# Patient Record
Sex: FEMALE | Race: WHITE | NOT HISPANIC OR LATINO | Employment: UNEMPLOYED | URBAN - METROPOLITAN AREA
[De-identification: names, ages, dates, MRNs, and addresses within clinical notes are randomized per-mention and may not be internally consistent; named-entity substitution may affect disease eponyms.]

---

## 2023-01-20 PROBLEM — R19.7 DIARRHEA: Status: ACTIVE | Noted: 2023-01-20

## 2023-01-20 PROBLEM — R15.9 FECAL INCONTINENCE: Status: ACTIVE | Noted: 2023-01-20

## 2023-01-20 PROBLEM — F41.1 GENERALIZED ANXIETY DISORDER: Status: ACTIVE | Noted: 2023-01-20

## 2023-01-20 PROBLEM — J44.9 CHRONIC OBSTRUCTIVE PULMONARY DISEASE (MULTI): Status: ACTIVE | Noted: 2023-01-20

## 2023-01-20 PROBLEM — M43.16 SPONDYLOLISTHESIS OF LUMBAR REGION: Status: ACTIVE | Noted: 2023-01-20

## 2023-01-20 PROBLEM — M51.369 DEGENERATION OF INTERVERTEBRAL DISC OF LUMBAR REGION: Status: ACTIVE | Noted: 2023-01-20

## 2023-01-20 PROBLEM — M51.36 DEGENERATION OF INTERVERTEBRAL DISC OF LUMBAR REGION: Status: ACTIVE | Noted: 2023-01-20

## 2023-01-20 PROBLEM — J30.1 SEASONAL ALLERGIC RHINITIS DUE TO POLLEN: Status: ACTIVE | Noted: 2023-01-20
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
## 2023-01-20 PROBLEM — R29.898 LEG WEAKNESS: Status: ACTIVE | Noted: 2023-01-20

## 2023-01-20 PROBLEM — M62.81 WEAKNESS OF TRUNK MUSCULATURE: Status: ACTIVE | Noted: 2023-01-20

## 2023-01-20 PROBLEM — R04.2 HEMOPTYSIS: Status: ACTIVE | Noted: 2023-01-20

## 2023-01-20 PROBLEM — R41.3 MEMORY LOSS: Status: ACTIVE | Noted: 2023-01-20

## 2023-01-20 PROBLEM — M25.511 RIGHT SHOULDER PAIN: Status: ACTIVE | Noted: 2023-01-20

## 2023-01-20 PROBLEM — E78.5 HYPERLIPIDEMIA: Status: ACTIVE | Noted: 2023-01-20

## 2023-01-20 PROBLEM — M10.9 GOUT: Status: ACTIVE | Noted: 2023-01-20

## 2023-01-20 PROBLEM — M25.50 ARTHRALGIA: Status: ACTIVE | Noted: 2023-01-20

## 2023-01-20 PROBLEM — M47.816 LUMBAR SPONDYLOSIS: Status: ACTIVE | Noted: 2023-01-20

## 2023-01-20 PROBLEM — R22.9 LUMP OF SKIN: Status: ACTIVE | Noted: 2023-01-20

## 2023-01-20 PROBLEM — R73.01 IFG (IMPAIRED FASTING GLUCOSE): Status: ACTIVE | Noted: 2023-01-20

## 2023-01-20 PROBLEM — M25.551 HIP PAIN, RIGHT: Status: ACTIVE | Noted: 2023-01-20

## 2023-01-20 RX ORDER — HYDROXYZINE HYDROCHLORIDE 50 MG/1
TABLET, FILM COATED ORAL 3 TIMES DAILY PRN
COMMUNITY
End: 2023-03-14 | Stop reason: SDUPTHER

## 2023-01-20 RX ORDER — ALBUTEROL SULFATE 90 UG/1
1 AEROSOL, METERED RESPIRATORY (INHALATION) EVERY 4 HOURS PRN
COMMUNITY
End: 2023-03-14 | Stop reason: SDUPTHER

## 2023-01-20 RX ORDER — TURMERIC 400 MG
CAPSULE ORAL
COMMUNITY

## 2023-01-20 RX ORDER — TIOTROPIUM BROMIDE INHALATION SPRAY 3.12 UG/1
2 SPRAY, METERED RESPIRATORY (INHALATION) DAILY
COMMUNITY
End: 2023-03-14 | Stop reason: SDUPTHER

## 2023-01-20 RX ORDER — ROSUVASTATIN CALCIUM 10 MG/1
10 TABLET, COATED ORAL DAILY
COMMUNITY
End: 2023-03-14 | Stop reason: SDUPTHER

## 2023-01-20 RX ORDER — SERTRALINE HYDROCHLORIDE 100 MG/1
100 TABLET, FILM COATED ORAL DAILY
COMMUNITY
End: 2023-03-14 | Stop reason: SDUPTHER

## 2023-01-20 RX ORDER — FLUTICASONE PROPIONATE AND SALMETEROL 250; 50 UG/1; UG/1
1 POWDER RESPIRATORY (INHALATION) EVERY 12 HOURS
COMMUNITY
End: 2023-03-14 | Stop reason: ALTCHOICE

## 2023-01-20 RX ORDER — FLUTICASONE PROPIONATE AND SALMETEROL 250; 50 UG/1; UG/1
1 POWDER RESPIRATORY (INHALATION) EVERY 12 HOURS
COMMUNITY
End: 2023-03-14 | Stop reason: SDUPTHER

## 2023-01-20 RX ORDER — FERROUS GLUCONATE 256(28)MG
28 TABLET ORAL
COMMUNITY
End: 2024-06-06 | Stop reason: ALTCHOICE

## 2023-01-20 RX ORDER — NICOTINE POLACRILEX 2 MG
GUM BUCCAL
COMMUNITY

## 2023-01-20 RX ORDER — MONTELUKAST SODIUM 10 MG/1
10 TABLET ORAL NIGHTLY
COMMUNITY
End: 2023-03-14 | Stop reason: SDUPTHER

## 2023-03-02 PROBLEM — R29.898 WEAKNESS OF RIGHT LOWER EXTREMITY: Status: ACTIVE | Noted: 2023-03-02

## 2023-03-06 ENCOUNTER — APPOINTMENT (OUTPATIENT)
Dept: PRIMARY CARE | Facility: CLINIC | Age: 73
End: 2023-03-06
Payer: MEDICARE

## 2023-03-09 LAB
ALANINE AMINOTRANSFERASE (SGPT) (U/L) IN SER/PLAS: 13 U/L (ref 7–45)
ALBUMIN (G/DL) IN SER/PLAS: 4.5 G/DL (ref 3.4–5)
ALKALINE PHOSPHATASE (U/L) IN SER/PLAS: 77 U/L (ref 33–136)
ANION GAP IN SER/PLAS: 12 MMOL/L (ref 10–20)
ASPARTATE AMINOTRANSFERASE (SGOT) (U/L) IN SER/PLAS: 13 U/L (ref 9–39)
BILIRUBIN TOTAL (MG/DL) IN SER/PLAS: 0.6 MG/DL (ref 0–1.2)
CALCIUM (MG/DL) IN SER/PLAS: 9.5 MG/DL (ref 8.6–10.3)
CARBON DIOXIDE, TOTAL (MMOL/L) IN SER/PLAS: 27 MMOL/L (ref 21–32)
CHLORIDE (MMOL/L) IN SER/PLAS: 107 MMOL/L (ref 98–107)
CHOLESTEROL (MG/DL) IN SER/PLAS: 167 MG/DL (ref 0–199)
CHOLESTEROL IN HDL (MG/DL) IN SER/PLAS: 42.7 MG/DL
CHOLESTEROL/HDL RATIO: 3.9
CREATININE (MG/DL) IN SER/PLAS: 0.72 MG/DL (ref 0.5–1.05)
ERYTHROCYTE DISTRIBUTION WIDTH (RATIO) BY AUTOMATED COUNT: 13.2 % (ref 11.5–14.5)
ERYTHROCYTE MEAN CORPUSCULAR HEMOGLOBIN CONCENTRATION (G/DL) BY AUTOMATED: 31.8 G/DL (ref 32–36)
ERYTHROCYTE MEAN CORPUSCULAR VOLUME (FL) BY AUTOMATED COUNT: 86 FL (ref 80–100)
ERYTHROCYTES (10*6/UL) IN BLOOD BY AUTOMATED COUNT: 5.25 X10E12/L (ref 4–5.2)
ESTIMATED AVERAGE GLUCOSE FOR HBA1C: 123 MG/DL
GFR FEMALE: 89 ML/MIN/1.73M2
GLUCOSE (MG/DL) IN SER/PLAS: 100 MG/DL (ref 74–99)
HEMATOCRIT (%) IN BLOOD BY AUTOMATED COUNT: 45 % (ref 36–46)
HEMOGLOBIN (G/DL) IN BLOOD: 14.3 G/DL (ref 12–16)
HEMOGLOBIN A1C/HEMOGLOBIN TOTAL IN BLOOD: 5.9 %
LDL: 81 MG/DL (ref 0–99)
LEUKOCYTES (10*3/UL) IN BLOOD BY AUTOMATED COUNT: 8.6 X10E9/L (ref 4.4–11.3)
NON HDL CHOLESTEROL: 124 MG/DL
PLATELETS (10*3/UL) IN BLOOD AUTOMATED COUNT: 216 X10E9/L (ref 150–450)
POTASSIUM (MMOL/L) IN SER/PLAS: 4.5 MMOL/L (ref 3.5–5.3)
PROTEIN TOTAL: 6.9 G/DL (ref 6.4–8.2)
SODIUM (MMOL/L) IN SER/PLAS: 141 MMOL/L (ref 136–145)
TRIGLYCERIDE (MG/DL) IN SER/PLAS: 216 MG/DL (ref 0–149)
UREA NITROGEN (MG/DL) IN SER/PLAS: 18 MG/DL (ref 6–23)
VLDL: 43 MG/DL (ref 0–40)

## 2023-03-14 ENCOUNTER — OFFICE VISIT (OUTPATIENT)
Dept: PRIMARY CARE | Facility: CLINIC | Age: 73
End: 2023-03-14
Payer: MEDICARE

## 2023-03-14 VITALS
DIASTOLIC BLOOD PRESSURE: 84 MMHG | BODY MASS INDEX: 30.9 KG/M2 | OXYGEN SATURATION: 95 % | HEIGHT: 64 IN | HEART RATE: 85 BPM | SYSTOLIC BLOOD PRESSURE: 131 MMHG | WEIGHT: 181 LBS | TEMPERATURE: 96.4 F

## 2023-03-14 DIAGNOSIS — J43.9 PULMONARY EMPHYSEMA, UNSPECIFIED EMPHYSEMA TYPE (MULTI): Primary | ICD-10-CM

## 2023-03-14 DIAGNOSIS — E78.2 MIXED HYPERLIPIDEMIA: ICD-10-CM

## 2023-03-14 DIAGNOSIS — J30.1 SEASONAL ALLERGIC RHINITIS DUE TO POLLEN: ICD-10-CM

## 2023-03-14 DIAGNOSIS — R73.01 IFG (IMPAIRED FASTING GLUCOSE): ICD-10-CM

## 2023-03-14 DIAGNOSIS — R05.3 PERSISTENT COUGH: Chronic | ICD-10-CM

## 2023-03-14 DIAGNOSIS — R06.02 SHORTNESS OF BREATH: Chronic | ICD-10-CM

## 2023-03-14 DIAGNOSIS — F41.1 GENERALIZED ANXIETY DISORDER: ICD-10-CM

## 2023-03-14 PROBLEM — R19.7 DIARRHEA: Status: RESOLVED | Noted: 2023-01-20 | Resolved: 2023-03-14

## 2023-03-14 PROBLEM — R04.2 HEMOPTYSIS: Status: RESOLVED | Noted: 2023-01-20 | Resolved: 2023-03-14
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
## 2023-03-14 PROBLEM — R41.3 MEMORY LOSS: Status: RESOLVED | Noted: 2023-01-20 | Resolved: 2023-03-14

## 2023-03-14 PROBLEM — M10.9 GOUT: Status: RESOLVED | Noted: 2023-01-20 | Resolved: 2023-03-14

## 2023-03-14 PROBLEM — R22.9 LUMP OF SKIN: Status: RESOLVED | Noted: 2023-01-20 | Resolved: 2023-03-14

## 2023-03-14 PROBLEM — R29.898 WEAKNESS OF RIGHT LOWER EXTREMITY: Status: RESOLVED | Noted: 2023-03-02 | Resolved: 2023-03-14

## 2023-03-14 PROCEDURE — 1160F RVW MEDS BY RX/DR IN RCRD: CPT | Performed by: STUDENT IN AN ORGANIZED HEALTH CARE EDUCATION/TRAINING PROGRAM

## 2023-03-14 PROCEDURE — 99214 OFFICE O/P EST MOD 30 MIN: CPT | Performed by: STUDENT IN AN ORGANIZED HEALTH CARE EDUCATION/TRAINING PROGRAM

## 2023-03-14 PROCEDURE — 1159F MED LIST DOCD IN RCRD: CPT | Performed by: STUDENT IN AN ORGANIZED HEALTH CARE EDUCATION/TRAINING PROGRAM

## 2023-03-14 PROCEDURE — 1036F TOBACCO NON-USER: CPT | Performed by: STUDENT IN AN ORGANIZED HEALTH CARE EDUCATION/TRAINING PROGRAM

## 2023-03-14 RX ORDER — ALBUTEROL SULFATE 90 UG/1
1 AEROSOL, METERED RESPIRATORY (INHALATION) EVERY 4 HOURS PRN
Qty: 18 G | Refills: 3 | Status: SHIPPED | OUTPATIENT
Start: 2023-03-14 | End: 2023-09-06 | Stop reason: SDUPTHER

## 2023-03-14 RX ORDER — MONTELUKAST SODIUM 10 MG/1
10 TABLET ORAL NIGHTLY
Qty: 90 TABLET | Refills: 3 | Status: SHIPPED | OUTPATIENT
Start: 2023-03-14 | End: 2023-12-13 | Stop reason: SDUPTHER

## 2023-03-14 RX ORDER — SERTRALINE HYDROCHLORIDE 100 MG/1
100 TABLET, FILM COATED ORAL DAILY
Qty: 90 TABLET | Refills: 3 | Status: SHIPPED | OUTPATIENT
Start: 2023-03-14 | End: 2024-03-21 | Stop reason: SDUPTHER

## 2023-03-14 RX ORDER — AZELASTINE 1 MG/ML
1 SPRAY, METERED NASAL 2 TIMES DAILY
Qty: 30 ML | Refills: 12 | Status: SHIPPED | OUTPATIENT
Start: 2023-03-14 | End: 2023-04-14

## 2023-03-14 RX ORDER — TIOTROPIUM BROMIDE INHALATION SPRAY 3.12 UG/1
2 SPRAY, METERED RESPIRATORY (INHALATION) DAILY
Qty: 4 G | Refills: 3 | Status: SHIPPED | OUTPATIENT
Start: 2023-03-14 | End: 2023-04-04

## 2023-03-14 RX ORDER — ROSUVASTATIN CALCIUM 10 MG/1
10 TABLET, COATED ORAL DAILY
Qty: 90 TABLET | Refills: 3 | Status: SHIPPED | OUTPATIENT
Start: 2023-03-14 | End: 2023-12-13 | Stop reason: SDUPTHER

## 2023-03-14 RX ORDER — HYDROXYZINE HYDROCHLORIDE 50 MG/1
50 TABLET, FILM COATED ORAL NIGHTLY
Qty: 30 TABLET | Refills: 3 | Status: SHIPPED | OUTPATIENT
Start: 2023-03-14 | End: 2023-04-14

## 2023-03-14 RX ORDER — FLUTICASONE PROPIONATE AND SALMETEROL 250; 50 UG/1; UG/1
1 POWDER RESPIRATORY (INHALATION) EVERY 12 HOURS
Qty: 14 EACH | Refills: 3 | Status: SHIPPED | OUTPATIENT
Start: 2023-03-14 | End: 2023-03-14

## 2023-03-14 SDOH — ECONOMIC STABILITY: FOOD INSECURITY: WITHIN THE PAST 12 MONTHS, YOU WORRIED THAT YOUR FOOD WOULD RUN OUT BEFORE YOU GOT MONEY TO BUY MORE.: NEVER TRUE

## 2023-03-14 SDOH — ECONOMIC STABILITY: FOOD INSECURITY: WITHIN THE PAST 12 MONTHS, THE FOOD YOU BOUGHT JUST DIDN'T LAST AND YOU DIDN'T HAVE MONEY TO GET MORE.: NEVER TRUE

## 2023-03-14 ASSESSMENT — PATIENT HEALTH QUESTIONNAIRE - PHQ9
3. TROUBLE FALLING OR STAYING ASLEEP: NEARLY EVERY DAY
1. LITTLE INTEREST OR PLEASURE IN DOING THINGS: NEARLY EVERY DAY
2. FEELING DOWN, DEPRESSED OR HOPELESS: NEARLY EVERY DAY
6. FEELING BAD ABOUT YOURSELF - OR THAT YOU ARE A FAILURE OR HAVE LET YOURSELF OR YOUR FAMILY DOWN: NOT AT ALL
8. MOVING OR SPEAKING SO SLOWLY THAT OTHER PEOPLE COULD HAVE NOTICED. OR THE OPPOSITE, BEING SO FIGETY OR RESTLESS THAT YOU HAVE BEEN MOVING AROUND A LOT MORE THAN USUAL: NEARLY EVERY DAY
SUM OF ALL RESPONSES TO PHQ9 QUESTIONS 1 & 2: 6
4. FEELING TIRED OR HAVING LITTLE ENERGY: NEARLY EVERY DAY
7. TROUBLE CONCENTRATING ON THINGS, SUCH AS READING THE NEWSPAPER OR WATCHING TELEVISION: NEARLY EVERY DAY
9. THOUGHTS THAT YOU WOULD BE BETTER OFF DEAD, OR OF HURTING YOURSELF: NOT AT ALL

## 2023-03-14 ASSESSMENT — ENCOUNTER SYMPTOMS
OCCASIONAL FEELINGS OF UNSTEADINESS: 1
LOSS OF SENSATION IN FEET: 0
DEPRESSION: 1

## 2023-03-14 ASSESSMENT — LIFESTYLE VARIABLES
HOW MANY STANDARD DRINKS CONTAINING ALCOHOL DO YOU HAVE ON A TYPICAL DAY: 1 OR 2
HOW OFTEN DO YOU HAVE SIX OR MORE DRINKS ON ONE OCCASION: LESS THAN MONTHLY

## 2023-03-14 NOTE — PROGRESS NOTES
"Subjective   Patient ID: Viki Kumar is a 73 y.o. female who presents for Follow-up (6 month) and Cough (For 6 months (since she quit smoking)).    Pt presenting to follow up. She has been doing well. She stopped smoking on October 2022. But since then she endorses persistent cough, She also has some dyspnea but she is unsure if it is because of her emphysema. She denies any LOC, cp, dizziness. She denies any fevers, chills, nasal drainage or congestion, postnasal drainage. Her cough is mostly dry.  Her mood has been stable. She has been using hydroxyzine as needed especially at night, it helps her to sleep.          Review of Systems   All other systems reviewed and are negative.      Objective   /84 (BP Location: Right arm, Patient Position: Sitting, BP Cuff Size: Large adult)   Pulse 85   Temp 35.8 °C (96.4 °F)   Ht 1.626 m (5' 4\")   Wt 82.1 kg (181 lb)   SpO2 95%   BMI 31.07 kg/m²     Physical Exam  Vitals and nursing note reviewed.   Constitutional:       General: She is not in acute distress.     Appearance: She is not ill-appearing.   HENT:      Head: Normocephalic and atraumatic.   Cardiovascular:      Rate and Rhythm: Normal rate and regular rhythm.      Pulses: Normal pulses.      Heart sounds: Normal heart sounds. No murmur heard.     No friction rub. No gallop.   Pulmonary:      Effort: No respiratory distress.      Breath sounds: Decreased air movement present. No stridor. Wheezing present. No rhonchi or rales.   Neurological:      Mental Status: She is alert and oriented to person, place, and time.   Psychiatric:         Mood and Affect: Mood normal.         Assessment/Plan   Problem List Items Addressed This Visit          Respiratory    Shortness of breath (Chronic)     Chronic  Unclear but most likely secondary to emphysema         Relevant Medications    albuterol 90 mcg/actuation inhaler    Other Relevant Orders    Echocardiogram stress test    Chronic obstructive pulmonary " disease (CMS/[facility]) - Primary     Severe emphysema  Quitted smoking October 2022 - hx 50 pack year  Continue with Advair and Spiriva, albuterol as needed         Relevant Medications    albuterol 90 mcg/actuation inhaler    fluticasone propion-salmeteroL (Advair Diskus) 250-50 mcg/dose diskus inhaler    tiotropium (Spiriva Respimat) 2.5 mcg/actuation inhaler    Other Relevant Orders    Echocardiogram stress test       Endocrine/Metabolic    IFG (impaired fasting glucose)     Chronic and stable  Last A1C 5.9  Continue with low carb diet            Other    Generalized anxiety disorder     Chronic and stable  Continue with sertraline and hydroxyzine as needed         Relevant Medications    hydrOXYzine HCL (Atarax) 50 mg tablet    sertraline (Zoloft) 100 mg tablet    Hyperlipidemia     Chronic and stable  Continue with crestor         Relevant Medications    rosuvastatin (Crestor) 10 mg tablet    Other Relevant Orders    Echocardiogram stress test    Seasonal allergic rhinitis due to pollen     Chronic and stable  Continue with montelukast         Relevant Medications    montelukast (Singulair) 10 mg tablet     Other Visit Diagnoses       Persistent cough  (Chronic)       Unclear etiology  Most likely due to emphysema vs allergic rhinitis vs cardiac vs other    Relevant Medications    azelastine (Astelin) 137 mcg (0.1 %) nasal spray    Other Relevant Orders    XR chest 2 views    Echocardiogram stress test

## 2023-03-14 NOTE — ASSESSMENT & PLAN NOTE
Severe emphysema  Quitted smoking October 2022 - hx 50 pack year  Continue with Advair and Spiriva, albuterol as needed

## 2023-04-04 ENCOUNTER — OFFICE VISIT (OUTPATIENT)
Dept: PRIMARY CARE | Facility: CLINIC | Age: 73
End: 2023-04-04
Payer: MEDICARE

## 2023-04-04 VITALS
HEART RATE: 84 BPM | WEIGHT: 180 LBS | SYSTOLIC BLOOD PRESSURE: 136 MMHG | DIASTOLIC BLOOD PRESSURE: 84 MMHG | TEMPERATURE: 96.8 F | RESPIRATION RATE: 16 BRPM | HEIGHT: 64 IN | BODY MASS INDEX: 30.73 KG/M2 | OXYGEN SATURATION: 92 %

## 2023-04-04 DIAGNOSIS — Z12.12 SCREENING FOR COLORECTAL CANCER: ICD-10-CM

## 2023-04-04 DIAGNOSIS — J43.9 PULMONARY EMPHYSEMA, UNSPECIFIED EMPHYSEMA TYPE (MULTI): ICD-10-CM

## 2023-04-04 DIAGNOSIS — Z87.891 FORMER SMOKER: ICD-10-CM

## 2023-04-04 DIAGNOSIS — M85.051 FIBROUS DYSPLASIA (MONOSTOTIC), RIGHT THIGH: ICD-10-CM

## 2023-04-04 DIAGNOSIS — Z00.00 ENCOUNTER FOR MEDICARE ANNUAL WELLNESS EXAM: Primary | ICD-10-CM

## 2023-04-04 DIAGNOSIS — Z12.11 SCREENING FOR COLORECTAL CANCER: ICD-10-CM

## 2023-04-04 DIAGNOSIS — Z78.0 ASYMPTOMATIC MENOPAUSAL STATE: ICD-10-CM

## 2023-04-04 PROBLEM — J44.0 COPD (CHRONIC OBSTRUCTIVE PULMONARY DISEASE) WITH ACUTE BRONCHITIS (MULTI): Status: ACTIVE | Noted: 2023-04-04

## 2023-04-04 PROBLEM — R06.02 SHORTNESS OF BREATH: Chronic | Status: RESOLVED | Noted: 2023-03-14 | Resolved: 2023-04-04

## 2023-04-04 PROBLEM — M25.511 RIGHT SHOULDER PAIN: Status: RESOLVED | Noted: 2023-01-20 | Resolved: 2023-04-04

## 2023-04-04 PROBLEM — J44.0 COPD (CHRONIC OBSTRUCTIVE PULMONARY DISEASE) WITH ACUTE BRONCHITIS (MULTI): Status: RESOLVED | Noted: 2023-04-04 | Resolved: 2023-04-04

## 2023-04-04 PROBLEM — J20.9 COPD (CHRONIC OBSTRUCTIVE PULMONARY DISEASE) WITH ACUTE BRONCHITIS (MULTI): Status: RESOLVED | Noted: 2023-04-04 | Resolved: 2023-04-04

## 2023-04-04 PROBLEM — J20.9 COPD (CHRONIC OBSTRUCTIVE PULMONARY DISEASE) WITH ACUTE BRONCHITIS (MULTI): Status: ACTIVE | Noted: 2023-04-04

## 2023-04-04 PROBLEM — M62.81 WEAKNESS OF TRUNK MUSCULATURE: Status: RESOLVED | Noted: 2023-01-20 | Resolved: 2023-04-04

## 2023-04-04 PROCEDURE — 1160F RVW MEDS BY RX/DR IN RCRD: CPT

## 2023-04-04 PROCEDURE — 99214 OFFICE O/P EST MOD 30 MIN: CPT

## 2023-04-04 PROCEDURE — 1036F TOBACCO NON-USER: CPT

## 2023-04-04 PROCEDURE — 1170F FXNL STATUS ASSESSED: CPT

## 2023-04-04 PROCEDURE — G0444 DEPRESSION SCREEN ANNUAL: HCPCS

## 2023-04-04 PROCEDURE — 1159F MED LIST DOCD IN RCRD: CPT

## 2023-04-04 PROCEDURE — G0439 PPPS, SUBSEQ VISIT: HCPCS

## 2023-04-04 RX ORDER — FLUTICASONE FUROATE, UMECLIDINIUM BROMIDE AND VILANTEROL TRIFENATATE 100; 62.5; 25 UG/1; UG/1; UG/1
1 POWDER RESPIRATORY (INHALATION) DAILY
Qty: 28 EACH | Refills: 11 | Status: SHIPPED | OUTPATIENT
Start: 2023-04-04 | End: 2023-09-06 | Stop reason: ALTCHOICE

## 2023-04-04 ASSESSMENT — ENCOUNTER SYMPTOMS
GASTROINTESTINAL NEGATIVE: 1
CARDIOVASCULAR NEGATIVE: 1
CONSTITUTIONAL NEGATIVE: 1
EYES NEGATIVE: 1
HEMATOLOGIC/LYMPHATIC NEGATIVE: 1
ENDOCRINE NEGATIVE: 1
PSYCHIATRIC NEGATIVE: 1
WEAKNESS: 1
SHORTNESS OF BREATH: 1
MUSCULOSKELETAL NEGATIVE: 1
COUGH: 1

## 2023-04-04 ASSESSMENT — ACTIVITIES OF DAILY LIVING (ADL)
GROCERY_SHOPPING: INDEPENDENT
MANAGING_FINANCES: INDEPENDENT
DRESSING: INDEPENDENT
TAKING_MEDICATION: INDEPENDENT
DOING_HOUSEWORK: INDEPENDENT
BATHING: INDEPENDENT

## 2023-04-04 ASSESSMENT — PATIENT HEALTH QUESTIONNAIRE - PHQ9
SUM OF ALL RESPONSES TO PHQ9 QUESTIONS 1 AND 2: 0
2. FEELING DOWN, DEPRESSED OR HOPELESS: NOT AT ALL
1. LITTLE INTEREST OR PLEASURE IN DOING THINGS: NOT AT ALL

## 2023-04-04 ASSESSMENT — PAIN SCALES - GENERAL: PAINLEVEL: 0-NO PAIN

## 2023-04-04 NOTE — ASSESSMENT & PLAN NOTE
Having a lot of coughing and shortness of breath on exertion with Spiriva and Advair    Switch her to Trelogy 100-62.5-25mcg 1 puff daily to see if this improves breathing issues

## 2023-04-04 NOTE — ASSESSMENT & PLAN NOTE
Flu vaccination: Previously given this season  PCV: Previously given  PPSV: Previously given  Shingrix vaccine: Previously given  Cologuard: Recommended and ordered today  Mammogram: Completed 1/2023  DEXA scan: Recommended and ordered today

## 2023-04-04 NOTE — PROGRESS NOTES
Subjective   Reason for Visit: Viki Kumar is an 73 y.o. female here for a Medicare Wellness visit.     Quit smoking 6 months ago.  Having a lot of coughing throughout the day.     Frequent falls- a few times per month due to feeling off balance. Fell off pool ladder two years ago which required ED visit, no serious injury since then.    Frequently tired and out of breath.     Diet: Eating a good amount of veggies, fruits and protein. Ice cream nightly, 1-2 big can soda per day Drinking half per sitting. Drinking 2 cups coffee per day  Exercise: No regular exercise due to back pain and balance issues  Weight: Stable  Water: Drinking 6-8 bottles per day   Sleep: Bad sleep, thinks she might be getting too much sleep. Napping during the day. Goes to bed around 9P, sometimes already in bed. Waking up around 8:30AM, waking up frequently through the night. Has to urinate overnight  Social: , son and her  live with her  Professional: Retired /      Past Medical, Surgical, and Family History reviewed and updated in chart.    Reviewed all medications by prescribing practitioner or clinical pharmacist (such as prescriptions, OTCs, herbal therapies and supplements) and documented in the medical record.        Patient Care Team:  JADA WallerCNS as PCP - General (Family Medicine)     Review of Systems   Constitutional: Negative.    HENT: Negative.     Eyes: Negative.    Respiratory:  Positive for cough and shortness of breath.    Cardiovascular: Negative.    Gastrointestinal: Negative.    Endocrine: Negative.    Genitourinary: Negative.    Musculoskeletal: Negative.    Skin: Negative.    Neurological:  Positive for weakness.   Hematological: Negative.    Psychiatric/Behavioral: Negative.         Objective   Vitals:  /84 (BP Location: Left arm, Patient Position: Sitting, BP Cuff Size: Adult)   Pulse 84   Temp 36 °C (96.8 °F) (Temporal)   Resp 16   Ht 1.619 m  "(5' 3.75\")   Wt 81.6 kg (180 lb)   SpO2 92%   BMI 31.14 kg/m²       Physical Exam  Constitutional:       Appearance: Normal appearance.   HENT:      Head: Normocephalic and atraumatic.   Eyes:      Extraocular Movements: Extraocular movements intact.      Pupils: Pupils are equal, round, and reactive to light.   Cardiovascular:      Rate and Rhythm: Normal rate and regular rhythm.   Pulmonary:      Effort: Pulmonary effort is normal.      Breath sounds: Normal breath sounds.   Abdominal:      General: Abdomen is flat. Bowel sounds are normal.      Palpations: Abdomen is soft.   Musculoskeletal:         General: Normal range of motion.   Skin:     General: Skin is warm and dry.   Neurological:      General: No focal deficit present.      Mental Status: She is alert and oriented to person, place, and time.   Psychiatric:         Mood and Affect: Mood normal.         Behavior: Behavior normal.         Assessment/Plan   Problem List Items Addressed This Visit       Chronic obstructive pulmonary disease (CMS/HCC)    Current Assessment & Plan     Having a lot of coughing and shortness of breath on exertion with Spiriva and Advair    Switch her to Trelogy 100-62.5-25mcg 1 puff daily to see if this improves breathing issues         Relevant Medications    fluticasone-umeclidin-vilanter (Trelegy Ellipta) 100-62.5-25 mcg blister with device    Other Relevant Orders    Follow Up In Primary Care    CT lung screening low dose    Encounter for Medicare annual wellness exam - Primary    Overview     Diet: Eating a good amount of veggies, fruits and protein. Ice cream nightly, 1-2 big can soda per day Drinking half per sitting. Drinking 2 cups coffee per day  Exercise: No regular exercise due to back pain and balance issues  Weight: Stable  Water: Drinking 6-8 bottles per day   Sleep: Bad sleep, thinks she might be getting too much sleep. Napping during the day. Goes to bed around 9P, sometimes already in bed. Waking up around " 8:30AM, waking up frequently through the night. Has to urinate overnight  Social: , son and her  live with her  Professional: Retired /         Current Assessment & Plan       Flu vaccination: Previously given this season  PCV: Previously given  PPSV: Previously given  Shingrix vaccine: Previously given  Cologuard: Recommended and ordered today  Mammogram: Completed 1/2023  DEXA scan: Recommended and ordered today           Other Visit Diagnoses       Asymptomatic menopausal state        Relevant Orders    XR DEXA bone density    Screening for colorectal cancer        Relevant Orders    Colonoscopy    Former smoker        Relevant Orders    CT lung screening low dose    CT cardiac scoring wo IV contrast          Follow up with me in September or sooner as needed    Jeanna French APRN-CNS

## 2023-04-04 NOTE — PATIENT INSTRUCTIONS
Thank you for coming to see me today.  If you have any questions or concerns following our visit, please contact the office.  Phone: [phone]    Follow up with me in September or sooner as needed    1)  Please schedule a bone density scan to screen for osteoporosis and a colonoscopy - please call [phone] or stop to 's office (in the lab office) on your way out today.     2) START Trelogy inhaler- 1 puff daily once you're out of spiriva and advair inhalers.    3) Please schedule a CT Cardiac score and Low Dose CT Chest - please call [phone] or stop to 's office (in the lab office) on your way out today.

## 2023-04-08 DIAGNOSIS — F41.1 GENERALIZED ANXIETY DISORDER: ICD-10-CM

## 2023-04-08 DIAGNOSIS — R05.3 PERSISTENT COUGH: Chronic | ICD-10-CM

## 2023-04-12 DIAGNOSIS — J43.9 PULMONARY EMPHYSEMA, UNSPECIFIED EMPHYSEMA TYPE (MULTI): ICD-10-CM

## 2023-04-12 PROBLEM — R29.898 WEAKNESS OF RIGHT LOWER EXTREMITY: Status: ACTIVE | Noted: 2023-04-12

## 2023-04-12 PROBLEM — F32.A DEPRESSION: Status: ACTIVE | Noted: 2023-04-12

## 2023-04-12 PROBLEM — M62.81 WEAKNESS OF TRUNK MUSCULATURE: Status: ACTIVE | Noted: 2023-04-12

## 2023-04-12 PROBLEM — M10.9 GOUT: Status: ACTIVE | Noted: 2023-04-12

## 2023-04-12 PROBLEM — R41.3 MEMORY LOSS: Status: ACTIVE | Noted: 2023-04-12

## 2023-04-12 PROBLEM — R04.2 HEMOPTYSIS: Status: ACTIVE | Noted: 2023-04-12

## 2023-04-12 PROBLEM — M25.511 RIGHT SHOULDER PAIN: Status: ACTIVE | Noted: 2023-04-12
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
## 2023-04-12 PROBLEM — R06.02 SHORTNESS OF BREATH AT REST: Status: ACTIVE | Noted: 2017-02-22

## 2023-04-12 PROBLEM — R19.7 DIARRHEA: Status: ACTIVE | Noted: 2023-04-12

## 2023-04-12 PROBLEM — R22.9 LUMP OF SKIN: Status: ACTIVE | Noted: 2023-04-12

## 2023-04-12 PROBLEM — J20.9 ACUTE BRONCHITIS: Status: ACTIVE | Noted: 2017-02-22

## 2023-04-12 PROBLEM — R06.2 WHEEZING: Status: ACTIVE | Noted: 2017-02-22

## 2023-04-12 RX ORDER — BUSPIRONE HYDROCHLORIDE 7.5 MG/1
7.5 TABLET ORAL
COMMUNITY
End: 2024-06-06 | Stop reason: ALTCHOICE

## 2023-04-12 RX ORDER — MULTIVIT-MIN/IRON/FOLIC ACID/K 18-600-40
CAPSULE ORAL
COMMUNITY
End: 2023-12-13 | Stop reason: SDUPTHER

## 2023-04-12 RX ORDER — TIOTROPIUM BROMIDE 18 UG/1
1 CAPSULE ORAL; RESPIRATORY (INHALATION)
COMMUNITY
End: 2023-09-06 | Stop reason: SDUPTHER

## 2023-04-12 RX ORDER — FLUTICASONE PROPIONATE AND SALMETEROL 250; 50 UG/1; UG/1
1 POWDER RESPIRATORY (INHALATION) EVERY 12 HOURS
COMMUNITY
Start: 2022-12-02 | End: 2023-04-14 | Stop reason: ALTCHOICE

## 2023-04-12 RX ORDER — IBUPROFEN 200 MG
200 TABLET ORAL EVERY 2 HOUR PRN
COMMUNITY
Start: 2011-05-09

## 2023-04-12 RX ORDER — UMECLIDINIUM 62.5 UG/1
1 AEROSOL, POWDER ORAL DAILY
COMMUNITY
End: 2023-09-06 | Stop reason: ALTCHOICE

## 2023-04-12 RX ORDER — ALPRAZOLAM 0.5 MG/1
0.5 TABLET ORAL
COMMUNITY
Start: 2011-05-09 | End: 2023-06-12 | Stop reason: SDUPTHER

## 2023-04-14 RX ORDER — HYDROXYZINE HYDROCHLORIDE 50 MG/1
TABLET, FILM COATED ORAL
Qty: 30 TABLET | Refills: 5 | Status: SHIPPED | OUTPATIENT
Start: 2023-04-14 | End: 2024-06-06 | Stop reason: ALTCHOICE

## 2023-04-14 RX ORDER — AZELASTINE 1 MG/ML
SPRAY, METERED NASAL
Qty: 30 ML | Refills: 5 | Status: SHIPPED | OUTPATIENT
Start: 2023-04-14 | End: 2024-06-06 | Stop reason: ALTCHOICE

## 2023-04-14 RX ORDER — FLUTICASONE PROPIONATE AND SALMETEROL 250; 50 UG/1; UG/1
1 POWDER RESPIRATORY (INHALATION)
Qty: 60 EACH | Refills: 1 | Status: SHIPPED | OUTPATIENT
Start: 2023-04-14 | End: 2023-12-13

## 2023-05-10 ENCOUNTER — HOSPITAL ENCOUNTER (OUTPATIENT)
Dept: DATA CONVERSION | Facility: HOSPITAL | Age: 73
End: 2023-05-10
Attending: INTERNAL MEDICINE | Admitting: INTERNAL MEDICINE
Payer: MEDICARE

## 2023-05-10 DIAGNOSIS — E78.5 HYPERLIPIDEMIA, UNSPECIFIED: ICD-10-CM

## 2023-05-10 DIAGNOSIS — E66.9 OBESITY, UNSPECIFIED: ICD-10-CM

## 2023-05-10 DIAGNOSIS — D12.3 BENIGN NEOPLASM OF TRANSVERSE COLON: ICD-10-CM

## 2023-05-10 DIAGNOSIS — Z86.010 PERSONAL HISTORY OF COLONIC POLYPS: ICD-10-CM

## 2023-05-10 DIAGNOSIS — K21.9 GASTRO-ESOPHAGEAL REFLUX DISEASE WITHOUT ESOPHAGITIS: ICD-10-CM

## 2023-05-10 DIAGNOSIS — F41.9 ANXIETY DISORDER, UNSPECIFIED: ICD-10-CM

## 2023-05-10 DIAGNOSIS — M10.9 GOUT, UNSPECIFIED: ICD-10-CM

## 2023-05-10 DIAGNOSIS — Z87.891 PERSONAL HISTORY OF NICOTINE DEPENDENCE: ICD-10-CM

## 2023-05-10 DIAGNOSIS — F31.9 BIPOLAR DISORDER, UNSPECIFIED (MULTI): ICD-10-CM

## 2023-05-10 DIAGNOSIS — J44.9 CHRONIC OBSTRUCTIVE PULMONARY DISEASE, UNSPECIFIED (MULTI): ICD-10-CM

## 2023-05-10 DIAGNOSIS — K57.30 DIVERTICULOSIS OF LARGE INTESTINE WITHOUT PERFORATION OR ABSCESS WITHOUT BLEEDING: ICD-10-CM

## 2023-05-10 DIAGNOSIS — K64.8 OTHER HEMORRHOIDS: ICD-10-CM

## 2023-05-10 DIAGNOSIS — D12.4 BENIGN NEOPLASM OF DESCENDING COLON: ICD-10-CM

## 2023-05-10 DIAGNOSIS — D12.2 BENIGN NEOPLASM OF ASCENDING COLON: ICD-10-CM

## 2023-05-10 DIAGNOSIS — Z12.11 ENCOUNTER FOR SCREENING FOR MALIGNANT NEOPLASM OF COLON: ICD-10-CM

## 2023-05-16 LAB
COMPLETE PATHOLOGY REPORT: NORMAL
CONVERTED CLINICAL DIAGNOSIS-HISTORY: NORMAL
CONVERTED FINAL DIAGNOSIS: NORMAL
CONVERTED FINAL REPORT PDF LINK TO COPY AND PASTE: NORMAL
CONVERTED GROSS DESCRIPTION: NORMAL

## 2023-06-12 DIAGNOSIS — F41.1 GENERALIZED ANXIETY DISORDER: Primary | ICD-10-CM

## 2023-06-12 RX ORDER — ALPRAZOLAM 0.5 MG/1
0.5 TABLET ORAL DAILY PRN
Qty: 30 TABLET | Refills: 0 | Status: SHIPPED | OUTPATIENT
Start: 2023-06-12 | End: 2024-06-06 | Stop reason: ALTCHOICE

## 2023-06-12 NOTE — TELEPHONE ENCOUNTER
I can give her 30 tabs but for long term she will need to be seen sooner than September. Also will need to sign CSA with me for further refills. Please let her know she should use sparingly as use of this medication will increase her risk for drowsiness and falls

## 2023-06-12 NOTE — TELEPHONE ENCOUNTER
Refill requested for Viki's alprazolam (Xanax).  The medication was last refilled on 12/30/2021 with 60 tablets and 0 refills. Will issue her 30 tablets with no refills and will need UDS and CSA for further dispensations.    Jeanna WILLIAMSON-CNS

## 2023-06-12 NOTE — TELEPHONE ENCOUNTER
Patient is calling to request a script for ALPRAZolam (Xanax) 0.5 mg .  States that you spoke about this during your last visit.    LOV 4/4 NOV 9/14    Pharmacy is [facility] in [place]

## 2023-08-08 ENCOUNTER — APPOINTMENT (OUTPATIENT)
Dept: PRIMARY CARE | Facility: CLINIC | Age: 73
End: 2023-08-08
Payer: MEDICARE

## 2023-09-06 ENCOUNTER — OFFICE VISIT (OUTPATIENT)
Dept: PRIMARY CARE | Facility: CLINIC | Age: 73
End: 2023-09-06
Payer: MEDICARE

## 2023-09-06 VITALS
SYSTOLIC BLOOD PRESSURE: 124 MMHG | BODY MASS INDEX: 31.31 KG/M2 | HEART RATE: 74 BPM | RESPIRATION RATE: 16 BRPM | DIASTOLIC BLOOD PRESSURE: 74 MMHG | TEMPERATURE: 96.4 F | OXYGEN SATURATION: 99 % | WEIGHT: 181 LBS

## 2023-09-06 DIAGNOSIS — R73.9 HYPERGLYCEMIA: ICD-10-CM

## 2023-09-06 DIAGNOSIS — R06.02 SHORTNESS OF BREATH: Chronic | ICD-10-CM

## 2023-09-06 DIAGNOSIS — J43.9 PULMONARY EMPHYSEMA, UNSPECIFIED EMPHYSEMA TYPE (MULTI): ICD-10-CM

## 2023-09-06 DIAGNOSIS — Z96.641 HISTORY OF HIP REPLACEMENT, TOTAL, RIGHT: ICD-10-CM

## 2023-09-06 DIAGNOSIS — M81.0 OSTEOPOROSIS, UNSPECIFIED OSTEOPOROSIS TYPE, UNSPECIFIED PATHOLOGICAL FRACTURE PRESENCE: ICD-10-CM

## 2023-09-06 DIAGNOSIS — E78.2 MIXED HYPERLIPIDEMIA: ICD-10-CM

## 2023-09-06 DIAGNOSIS — F51.04 PSYCHOPHYSIOLOGICAL INSOMNIA: ICD-10-CM

## 2023-09-06 DIAGNOSIS — M19.90 OSTEOARTHRITIS, UNSPECIFIED OSTEOARTHRITIS TYPE, UNSPECIFIED SITE: Primary | ICD-10-CM

## 2023-09-06 PROBLEM — L90.5 SCAR CONDITION AND FIBROSIS OF SKIN: Status: ACTIVE | Noted: 2017-02-28

## 2023-09-06 PROBLEM — L57.0 ACTINIC KERATOSIS: Status: ACTIVE | Noted: 2017-02-28

## 2023-09-06 PROBLEM — L82.1 OTHER SEBORRHEIC KERATOSIS: Status: ACTIVE | Noted: 2017-02-28

## 2023-09-06 PROBLEM — D48.5 NEOPLASM OF UNCERTAIN BEHAVIOR OF SKIN: Status: ACTIVE | Noted: 2017-02-28

## 2023-09-06 PROBLEM — L57.9 SKIN CHANGES DUE TO CHRONIC EXPOSURE TO NONIONIZING RADIATION, UNSPECIFIED: Status: ACTIVE | Noted: 2017-02-28

## 2023-09-06 PROCEDURE — 1159F MED LIST DOCD IN RCRD: CPT

## 2023-09-06 PROCEDURE — 1160F RVW MEDS BY RX/DR IN RCRD: CPT

## 2023-09-06 PROCEDURE — 1036F TOBACCO NON-USER: CPT

## 2023-09-06 PROCEDURE — 1126F AMNT PAIN NOTED NONE PRSNT: CPT

## 2023-09-06 PROCEDURE — 99214 OFFICE O/P EST MOD 30 MIN: CPT

## 2023-09-06 RX ORDER — FLUTICASONE PROPIONATE AND SALMETEROL 500; 50 UG/1; UG/1
1 POWDER RESPIRATORY (INHALATION)
Qty: 60 EACH | Refills: 11 | Status: SHIPPED | OUTPATIENT
Start: 2023-09-06 | End: 2023-12-13

## 2023-09-06 RX ORDER — TIOTROPIUM BROMIDE 18 UG/1
1 CAPSULE ORAL; RESPIRATORY (INHALATION)
Qty: 30 CAPSULE | Refills: 11 | Status: SHIPPED | OUTPATIENT
Start: 2023-09-06 | End: 2024-06-06 | Stop reason: ALTCHOICE

## 2023-09-06 RX ORDER — DENOSUMAB 60 MG/ML
60 INJECTION SUBCUTANEOUS ONCE
Qty: 1 ML | Refills: 0 | Status: SHIPPED | OUTPATIENT
Start: 2023-09-06 | End: 2023-09-06

## 2023-09-06 RX ORDER — ALBUTEROL SULFATE 90 UG/1
1 AEROSOL, METERED RESPIRATORY (INHALATION) EVERY 4 HOURS PRN
Qty: 18 G | Refills: 11 | Status: SHIPPED | OUTPATIENT
Start: 2023-09-06 | End: 2024-06-06

## 2023-09-06 RX ORDER — TRAZODONE HYDROCHLORIDE 50 MG/1
50 TABLET ORAL NIGHTLY PRN
Qty: 30 TABLET | Refills: 5 | Status: SHIPPED | OUTPATIENT
Start: 2023-09-06 | End: 2023-10-04

## 2023-09-06 SDOH — ECONOMIC STABILITY: FOOD INSECURITY: WITHIN THE PAST 12 MONTHS, YOU WORRIED THAT YOUR FOOD WOULD RUN OUT BEFORE YOU GOT MONEY TO BUY MORE.: NEVER TRUE

## 2023-09-06 SDOH — ECONOMIC STABILITY: FOOD INSECURITY: WITHIN THE PAST 12 MONTHS, THE FOOD YOU BOUGHT JUST DIDN'T LAST AND YOU DIDN'T HAVE MONEY TO GET MORE.: NEVER TRUE

## 2023-09-06 ASSESSMENT — ENCOUNTER SYMPTOMS
DEPRESSION: 0
CARDIOVASCULAR NEGATIVE: 1
RESPIRATORY NEGATIVE: 1
HEMATOLOGIC/LYMPHATIC NEGATIVE: 1
LOSS OF SENSATION IN FEET: 0
ENDOCRINE NEGATIVE: 1
EYES NEGATIVE: 1
GASTROINTESTINAL NEGATIVE: 1
NEUROLOGICAL NEGATIVE: 1
CONSTITUTIONAL NEGATIVE: 1
PSYCHIATRIC NEGATIVE: 1
MUSCULOSKELETAL NEGATIVE: 1
OCCASIONAL FEELINGS OF UNSTEADINESS: 1

## 2023-09-06 ASSESSMENT — LIFESTYLE VARIABLES
HOW OFTEN DO YOU HAVE SIX OR MORE DRINKS ON ONE OCCASION: NEVER
HOW OFTEN DO YOU HAVE A DRINK CONTAINING ALCOHOL: NEVER
AUDIT-C TOTAL SCORE: 0
HOW MANY STANDARD DRINKS CONTAINING ALCOHOL DO YOU HAVE ON A TYPICAL DAY: PATIENT DOES NOT DRINK
SKIP TO QUESTIONS 9-10: 1

## 2023-09-06 ASSESSMENT — PATIENT HEALTH QUESTIONNAIRE - PHQ9
SUM OF ALL RESPONSES TO PHQ9 QUESTIONS 1 & 2: 0
2. FEELING DOWN, DEPRESSED OR HOPELESS: NOT AT ALL
1. LITTLE INTEREST OR PLEASURE IN DOING THINGS: NOT AT ALL

## 2023-09-06 ASSESSMENT — PAIN SCALES - GENERAL: PAINLEVEL: 0-NO PAIN

## 2023-09-06 NOTE — PATIENT INSTRUCTIONS
Thank you for coming to see me today.  If you have any questions or concerns following our visit, please contact the office.  Phone: [phone]    Follow up with me in 3-4 months or sooner as needed to see how     1)  INCREASE wixela to 500-50 1 inhalation twice daily. Take Spiriva every day.     2)  I am referring you to orthopedic surgery for hip evaluation and hand injections - please call [phone] to schedule an appointment or stop to 's office (in the lab) on your way out today      3) Get fasting labwork in the next 1-2 weeks.  The lab is down the [address] from our office.     4) START trazodone 50-100mg as needed for sleep at bedtime    5) START Prolia injections every 6 months for osteoporosis. The infusion center will call you to set these up once approved via insurance

## 2023-09-06 NOTE — PROGRESS NOTES
Subjective   Patient ID: Viki Kumar is a 73 y.o. female who presents for follow up.    1) Breathing- doesn't like Trelogy, would like to go back to [facility]. Short of breath walking 10-15 feet or with changing clothes.     2) Falling- having issues with falls at home, stumbles a lot then starts losing balance and then she starts to fall. Doesn't do well with uneven ground. Right hip replaced 20 years ago, was told 5 years ago that it was fine. Presented due to limp. Not having pain.   **Refer to ortho hip to evaluate, recs appreciated    3) Arthritis pain- fingers locking up attributes to hand spasm.   **Refer to ortho hand for evaluation    Diet: Eating a good amount of veggies, fruits and protein. Ice cream nightly, 1-2 big can soda per day Drinking half per sitting. Drinking 2 cups coffee per day  Exercise: No regular exercise due to back pain and balance issues  Weight: Stable  Water: Drinking 6-8 bottles per day   Sleep: Bad sleep, thinks she might be getting too much sleep. Napping during the day. Goes to bed around 9P, sometimes already in bed. Waking up around 8:30AM, waking up frequently through the night. Has to urinate overnight  Social: , son and her  live with her  Professional: Retired /    Review of Systems   Constitutional: Negative.    HENT: Negative.     Eyes: Negative.    Respiratory: Negative.     Cardiovascular: Negative.    Gastrointestinal: Negative.    Endocrine: Negative.    Genitourinary: Negative.    Musculoskeletal: Negative.    Skin: Negative.    Neurological: Negative.    Hematological: Negative.    Psychiatric/Behavioral: Negative.          Current Outpatient Medications   Medication Sig Dispense Refill    ALPRAZolam (Xanax) 0.5 mg tablet Take 1 tablet (0.5 mg) by mouth once daily as needed for anxiety. 30 tablet 0    ascorbic acid (VITAMIN C ORAL) Take by mouth.      ascorbic acid, vitamin C, 500 mg capsule Take by mouth.      azelastine  (Astelin) 137 mcg (0.1 %) nasal spray SPRAY 1 SPRAY INTO EACH NOSTRIL IN THE MORNING AND BEFORE BEDTIME AS DIRECTED 30 mL 5    biotin 1 mg capsule Take by mouth.      busPIRone (Buspar) 7.5 mg tablet Take 1 tablet (7.5 mg) by mouth.      CALCIUM CITRATE ORAL Take by mouth.      CALCIUM ORAL Take by mouth.      docosahexaenoic acid/epa (FISH OIL ORAL) Take by mouth.      ferrous gluconate 256 mg (28 mg iron) tablet Take 1 tablet (28 mg) by mouth.      fluticasone propion-salmeteroL (Wixela Inhub) 250-50 mcg/dose diskus inhaler Inhale 1 puff  in the morning and 1 puff in the evening. 60 each 1    hydrOXYzine HCL (Atarax) 50 mg tablet TAKE 1 TABLET BY MOUTH ONCE DAILY AT BEDTIME. 30 tablet 5    ibuprofen 200 mg tablet Take 1 tablet (200 mg) by mouth every 2 hours if needed.      MAGNESIUM ORAL Take 200 mg by mouth.      MILK THISTLE ORAL Take 1,000 mg by mouth.      montelukast (Singulair) 10 mg tablet Take 1 tablet (10 mg) by mouth once daily at bedtime. 90 tablet 3    rosuvastatin (Crestor) 10 mg tablet Take 1 tablet (10 mg) by mouth once daily. 90 tablet 3    sertraline (Zoloft) 100 mg tablet Take 1 tablet (100 mg) by mouth once daily. 90 tablet 3    turmeric 400 mg capsule Take by mouth.      VITAMIN B COMPLEX ORAL Take by mouth.      ZINC ORAL Take 100 mg by mouth.      albuterol 90 mcg/actuation inhaler Inhale 1 puff every 4 hours if needed for wheezing or shortness of breath. 18 g 11    denosumab (Prolia) 60 mg/mL syringe Inject 1 mL (60 mg) under the skin 1 time for 1 dose. 1 mL 0    fluticasone propion-salmeteroL (Wixela Inhub) 500-50 mcg/dose diskus inhaler Inhale 1 puff 2 times a day. Rinse mouth with water after use to reduce aftertaste and incidence of candidiasis. Do not swallow. 60 each 11    tiotropium (Spiriva with HandiHaler) 18 mcg inhalation capsule Place 1 capsule (18 mcg) into inhaler and inhale once daily. 30 capsule 11    traZODone (Desyrel) 50 mg tablet Take 1 tablet (50 mg) by mouth as needed  at bedtime for sleep. 30 tablet 5     No current facility-administered medications for this visit.     Past Surgical History:   Procedure Laterality Date    OTHER SURGICAL HISTORY  2018    Hernia repair    OTHER SURGICAL HISTORY  2018    Tonsillectomy    OTHER SURGICAL HISTORY  2018    Hip surgery    OTHER SURGICAL HISTORY  2018    Bone transplantation autograft     Family History   Problem Relation Name Age of Onset    Other (SMALL CELL CARCINOMA) Mother      Emphysema Mother      Emphysema Father      Heart failure Father      Emphysema Brother Mayo     Lung cancer Brother Mayo     Coronary artery disease Brother Mayo     Other (cardiac bypass) Brother Mayo     Other (shoulder replacement) Brother Timoteo Lizama Brother Timoteo     Memory loss Brother Manjit Lizama Brother Manjit     Accidental death Brother Amilcar       Social History     Tobacco Use    Smoking status: Former     Packs/day: 1.00     Years: 50.00     Additional pack years: 0.00     Total pack years: 50.00     Types: Cigarettes     Quit date: 10/1/2022     Years since quittin.9    Smokeless tobacco: Never   Vaping Use    Vaping Use: Former   Substance Use Topics    Alcohol use: Yes     Alcohol/week: 1.0 standard drink of alcohol     Types: 1 Standard drinks or equivalent per week    Drug use: Not Currently        Objective     Visit Vitals  /74 (BP Location: Left arm, Patient Position: Sitting, BP Cuff Size: Adult)   Pulse 74   Temp 35.8 °C (96.4 °F) (Temporal)   Resp 16   Wt 82.1 kg (181 lb)   SpO2 99%   BMI 31.31 kg/m²   Smoking Status Former   BSA 1.92 m²        Physical Exam  Constitutional:       Appearance: Normal appearance.   HENT:      Head: Normocephalic and atraumatic.   Eyes:      Extraocular Movements: Extraocular movements intact.      Pupils: Pupils are equal, round, and reactive to light.   Musculoskeletal:         General: Normal range of motion.   Neurological:      General: No focal deficit present.       Mental Status: She is alert and oriented to person, place, and time.   Psychiatric:         Mood and Affect: Mood normal.         Behavior: Behavior normal.           Assessment/Plan   Problem List Items Addressed This Visit       Chronic obstructive pulmonary disease (CMS/HCC)     Doesn't like Trelogy, having more mucous and shortness of breath, short of breath walking in her home, putting clothes on    Start Wixela to 500-50mg   Refill spiriva and albuterol inhaler         Relevant Medications    tiotropium (Spiriva with HandiHaler) 18 mcg inhalation capsule    fluticasone propion-salmeteroL (Wixela Inhub) 500-50 mcg/dose diskus inhaler    albuterol 90 mcg/actuation inhaler    Hyperlipidemia    Relevant Orders    Comprehensive Metabolic Panel    Lipid Panel     Other Visit Diagnoses       Osteoarthritis, unspecified osteoarthritis type, unspecified site    -  Primary    Relevant Orders    Referral to Orthopaedic Surgery    Shortness of breath  (Chronic)       Relevant Medications    albuterol 90 mcg/actuation inhaler    History of hip replacement, total, right        Relevant Orders    Referral to Orthopaedic Surgery    Psychophysiological insomnia        Relevant Medications    traZODone (Desyrel) 50 mg tablet    Osteoporosis, unspecified osteoporosis type, unspecified pathological fracture presence        Relevant Orders    Vitamin D 25-Hydroxy,Total (for eval of Vitamin D levels)    Hyperglycemia        Relevant Orders    Hemoglobin A1C    CBC              All pertinent lab work and results were reviewed with patient.     Follow up with me in 3-4 months    CLAY Waller-CNS

## 2023-09-06 NOTE — ASSESSMENT & PLAN NOTE
Doesn't like Trelogy, having more mucous and shortness of breath, short of breath walking in her home, putting clothes on    Start Wixela to 500-50mg   Refill spiriva and albuterol inhaler

## 2023-09-14 ENCOUNTER — APPOINTMENT (OUTPATIENT)
Dept: PRIMARY CARE | Facility: CLINIC | Age: 73
End: 2023-09-14
Payer: MEDICARE

## 2023-09-15 PROBLEM — L57.9 SKIN CHANGES DUE TO CHRONIC EXPOSURE TO NONIONIZING RADIATION, UNSPECIFIED: Status: RESOLVED | Noted: 2017-02-28 | Resolved: 2023-09-15

## 2023-09-15 PROBLEM — L90.5 SCAR CONDITION AND FIBROSIS OF SKIN: Status: RESOLVED | Noted: 2017-02-28 | Resolved: 2023-09-15

## 2023-09-15 PROBLEM — R06.02 SHORTNESS OF BREATH AT REST: Status: RESOLVED | Noted: 2017-02-22 | Resolved: 2023-09-15

## 2023-09-15 PROBLEM — L82.1 OTHER SEBORRHEIC KERATOSIS: Status: RESOLVED | Noted: 2017-02-28 | Resolved: 2023-09-15

## 2023-09-15 PROBLEM — R04.2 HEMOPTYSIS: Status: RESOLVED | Noted: 2023-04-12 | Resolved: 2023-09-15

## 2023-09-15 PROBLEM — L57.0 ACTINIC KERATOSIS: Status: RESOLVED | Noted: 2017-02-28 | Resolved: 2023-09-15

## 2023-09-15 PROBLEM — R22.9 LUMP OF SKIN: Status: RESOLVED | Noted: 2023-04-12 | Resolved: 2023-09-15

## 2023-09-15 PROBLEM — R06.2 WHEEZING: Status: RESOLVED | Noted: 2017-02-22 | Resolved: 2023-09-15

## 2023-09-30 DIAGNOSIS — F51.04 PSYCHOPHYSIOLOGICAL INSOMNIA: ICD-10-CM

## 2023-10-04 RX ORDER — TRAZODONE HYDROCHLORIDE 50 MG/1
50 TABLET ORAL NIGHTLY PRN
Qty: 30 TABLET | Refills: 5 | Status: SHIPPED | OUTPATIENT
Start: 2023-10-04 | End: 2024-06-06 | Stop reason: ALTCHOICE

## 2023-10-05 DIAGNOSIS — M25.561 ACUTE PAIN OF RIGHT KNEE: ICD-10-CM

## 2023-10-05 DIAGNOSIS — M25.551 RIGHT HIP PAIN: ICD-10-CM

## 2023-10-11 ENCOUNTER — HOSPITAL ENCOUNTER (OUTPATIENT)
Dept: RADIOLOGY | Facility: HOSPITAL | Age: 73
Discharge: HOME | End: 2023-10-11
Payer: MEDICARE

## 2023-10-11 ENCOUNTER — OFFICE VISIT (OUTPATIENT)
Dept: ORTHOPEDIC SURGERY | Facility: CLINIC | Age: 73
End: 2023-10-11
Payer: MEDICARE

## 2023-10-11 VITALS — WEIGHT: 180 LBS | BODY MASS INDEX: 29.99 KG/M2 | HEIGHT: 65 IN

## 2023-10-11 DIAGNOSIS — M25.561 ACUTE PAIN OF RIGHT KNEE: ICD-10-CM

## 2023-10-11 DIAGNOSIS — M25.551 RIGHT HIP PAIN: ICD-10-CM

## 2023-10-11 DIAGNOSIS — M16.10 ARTHRITIS OF HIP: Primary | ICD-10-CM

## 2023-10-11 PROCEDURE — 73562 X-RAY EXAM OF KNEE 3: CPT | Mod: RT,FY

## 2023-10-11 PROCEDURE — 73562 X-RAY EXAM OF KNEE 3: CPT | Mod: RIGHT SIDE | Performed by: RADIOLOGY

## 2023-10-11 PROCEDURE — 73502 X-RAY EXAM HIP UNI 2-3 VIEWS: CPT | Mod: RT

## 2023-10-11 PROCEDURE — 73502 X-RAY EXAM HIP UNI 2-3 VIEWS: CPT | Mod: RIGHT SIDE | Performed by: RADIOLOGY

## 2023-10-11 NOTE — PROGRESS NOTES
Right knee pain for a year  [place] frequently - states it comes from her hip   Has a hard time doing stairs  Takes Ibuprofen PRN    Knee Musculoskeletal Exam        Right hip pain for a year - replaced 22 years ago in [facility]   No new injury - states she feels it pushing into her bone   Causes her knee to hurt   Takes Ibuprofen PRN     Hip Musculoskeletal Exam

## 2023-10-30 ENCOUNTER — LAB (OUTPATIENT)
Dept: LAB | Facility: LAB | Age: 73
End: 2023-10-30
Payer: MEDICARE

## 2023-10-30 ENCOUNTER — OFFICE VISIT (OUTPATIENT)
Dept: ORTHOPEDIC SURGERY | Facility: CLINIC | Age: 73
End: 2023-10-30
Payer: MEDICARE

## 2023-10-30 VITALS — WEIGHT: 181 LBS | HEIGHT: 65 IN | BODY MASS INDEX: 30.16 KG/M2

## 2023-10-30 DIAGNOSIS — M81.0 OSTEOPOROSIS, UNSPECIFIED OSTEOPOROSIS TYPE, UNSPECIFIED PATHOLOGICAL FRACTURE PRESENCE: ICD-10-CM

## 2023-10-30 DIAGNOSIS — E78.2 MIXED HYPERLIPIDEMIA: ICD-10-CM

## 2023-10-30 DIAGNOSIS — R73.9 HYPERGLYCEMIA: ICD-10-CM

## 2023-10-30 DIAGNOSIS — Z96.641 HISTORY OF HIP REPLACEMENT, TOTAL, RIGHT: Primary | ICD-10-CM

## 2023-10-30 DIAGNOSIS — M25.551 HIP PAIN, RIGHT: ICD-10-CM

## 2023-10-30 LAB
25(OH)D3 SERPL-MCNC: 32 NG/ML (ref 30–100)
ALBUMIN SERPL BCP-MCNC: 4.6 G/DL (ref 3.4–5)
ALP SERPL-CCNC: 67 U/L (ref 33–136)
ALT SERPL W P-5'-P-CCNC: 20 U/L (ref 7–45)
ANION GAP SERPL CALC-SCNC: 16 MMOL/L (ref 10–20)
AST SERPL W P-5'-P-CCNC: 18 U/L (ref 9–39)
BILIRUB SERPL-MCNC: 0.6 MG/DL (ref 0–1.2)
BUN SERPL-MCNC: 19 MG/DL (ref 6–23)
CALCIUM SERPL-MCNC: 9.5 MG/DL (ref 8.6–10.3)
CHLORIDE SERPL-SCNC: 107 MMOL/L (ref 98–107)
CHOLEST SERPL-MCNC: 156 MG/DL (ref 0–199)
CHOLESTEROL/HDL RATIO: 3.2
CO2 SERPL-SCNC: 20 MMOL/L (ref 21–32)
CREAT SERPL-MCNC: 0.75 MG/DL (ref 0.5–1.05)
ERYTHROCYTE [DISTWIDTH] IN BLOOD BY AUTOMATED COUNT: 13.4 % (ref 11.5–14.5)
EST. AVERAGE GLUCOSE BLD GHB EST-MCNC: 126 MG/DL
GFR SERPL CREATININE-BSD FRML MDRD: 85 ML/MIN/1.73M*2
GLUCOSE SERPL-MCNC: 94 MG/DL (ref 74–99)
HBA1C MFR BLD: 6 %
HCT VFR BLD AUTO: 45.1 % (ref 36–46)
HDLC SERPL-MCNC: 48.3 MG/DL
HGB BLD-MCNC: 14.7 G/DL (ref 12–16)
LDLC SERPL CALC-MCNC: 69 MG/DL
MCH RBC QN AUTO: 27.7 PG (ref 26–34)
MCHC RBC AUTO-ENTMCNC: 32.6 G/DL (ref 32–36)
MCV RBC AUTO: 85 FL (ref 80–100)
NON HDL CHOLESTEROL: 108 MG/DL (ref 0–149)
NRBC BLD-RTO: 0 /100 WBCS (ref 0–0)
PLATELET # BLD AUTO: 191 X10*3/UL (ref 150–450)
PMV BLD AUTO: 9.4 FL (ref 7.5–11.5)
POTASSIUM SERPL-SCNC: 4.9 MMOL/L (ref 3.5–5.3)
PROT SERPL-MCNC: 6.7 G/DL (ref 6.4–8.2)
RBC # BLD AUTO: 5.31 X10*6/UL (ref 4–5.2)
SODIUM SERPL-SCNC: 138 MMOL/L (ref 136–145)
TRIGL SERPL-MCNC: 195 MG/DL (ref 0–149)
VLDL: 39 MG/DL (ref 0–40)
WBC # BLD AUTO: 8.8 X10*3/UL (ref 4.4–11.3)

## 2023-10-30 PROCEDURE — 82306 VITAMIN D 25 HYDROXY: CPT

## 2023-10-30 PROCEDURE — 36415 COLL VENOUS BLD VENIPUNCTURE: CPT

## 2023-10-30 PROCEDURE — 83036 HEMOGLOBIN GLYCOSYLATED A1C: CPT

## 2023-10-30 PROCEDURE — 99214 OFFICE O/P EST MOD 30 MIN: CPT | Performed by: ORTHOPAEDIC SURGERY

## 2023-10-30 PROCEDURE — 1036F TOBACCO NON-USER: CPT | Performed by: ORTHOPAEDIC SURGERY

## 2023-10-30 PROCEDURE — 1160F RVW MEDS BY RX/DR IN RCRD: CPT | Performed by: ORTHOPAEDIC SURGERY

## 2023-10-30 PROCEDURE — 1159F MED LIST DOCD IN RCRD: CPT | Performed by: ORTHOPAEDIC SURGERY

## 2023-10-30 PROCEDURE — 1126F AMNT PAIN NOTED NONE PRSNT: CPT | Performed by: ORTHOPAEDIC SURGERY

## 2023-10-30 PROCEDURE — 85027 COMPLETE CBC AUTOMATED: CPT

## 2023-10-30 PROCEDURE — 80061 LIPID PANEL: CPT

## 2023-10-30 PROCEDURE — 80053 COMPREHEN METABOLIC PANEL: CPT

## 2023-10-30 ASSESSMENT — PAIN - FUNCTIONAL ASSESSMENT: PAIN_FUNCTIONAL_ASSESSMENT: NO/DENIES PAIN

## 2023-10-30 NOTE — PROGRESS NOTES
PRIMARY CARE PHYSICIAN: JUAN Waller  REFERRING PROVIDER: Jeanna MARTINEZ    CONSULT ORTHOPAEDIC: Hip Evaluation        ASSESSMENT & PLAN    IMPRESSION:  1.  History of right hip replacement for fibrous dysplasia  2.  Frequent falls and gait instability    PLAN:  Discussed with patient and daughter the findings above.  Currently there is no signs or symptoms of prosthetic joint infection, clinical instability or implant loosening.  We did review x-rays with comparing previous films to today's films and the implant appears to be stable.  She is about 2 to 3 cm long also compared to her left side and currently does not wear a lift.  I did discuss that the lift may help in terms of her balance but given that she has recurrent fall she may benefit from using a cane or a walker which she seems to be reluctant with per her daughter.  We additionally reviewed that when she feels that like the implant is moving her femur and that her implant is stable this could be instability from a musculoskeletal perspective but currently do not see any signs or reasons to proceed with revision arthroplasty.  I strongly recommended she try physical therapy to work on generalized conditioning in addition consider the option of a cane or walker for stability.  She may follow-up with me as needed.      SUBJECTIVE  CHIEF COMPLAINT:   Chief Complaint   Patient presents with    Right Hip - Pain        HPI: Viki Kumar is a 73 y.o. patient. Viki Kumar has had progressive problems with the right hip over the past 2 years. They do not report any trauma. They do not report any constant or progressive numbness or tingling in their legs. Their symptoms are interfering with activities which include pain and weakness in her right hip. She states she has had multiple falls, but no increased pain after her falls.  She feels at times that she has to think about taking steps and additionally feels sometimes  like the implant is moving and causes a sharp pain in her thigh that causes her to fall.  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  She feels that her leg length is different but does not use any lifts.    FUNCTIONAL STATUS: occasionally limited.  AMBULATORY STATUS: Independent community ambulation without devices  PREVIOUS TREATMENTS: NSAIDS ibuprofen with mild improvement  HISTORY OF SURGERY ON AFFECTED HIP(S): Yes Right KASHIF 20yrs ago, Dr Farmer at [facility] PAIN REPORTED: Yes       REVIEW OF SYSTEMS  Constitutional: See HPI for pain assessment, No significant weight loss, recent trauma  Cardiovascular: No chest pain, shortness of breath  Respiratory: No difficulty breathing, cough  Gastrointestinal: No nausea, vomiting, diarrhea, constipation  Musculoskeletal: Noted in HPI, positive for pain, restricted motion, stiffness  Integumentary: No rashes, easy bruising, redness   Neurological: no numbness or tingling in extremities, no gait disturbances   Psychiatric: No mood changes, memory changes, social issues  Heme/Lymph: no excessive swelling, easy bruising, excessive bleeding  ENT: No hearing changes  Eyes: No vision changes    Past Medical History:   Diagnosis Date    Actinic keratosis 02/28/2017    Arthralgia 01/20/2023    Bipolar disorder, unspecified (CMS/[facility])     Bipolar disease, chronic    Chronic obstructive pulmonary disease (CMS/[facility]) 01/20/2023    COPD (chronic obstructive pulmonary disease) with acute bronchitis (CMS/[facility]) 04/04/2023    Fecal incontinence 01/20/2023    Hemoptysis 04/12/2023    Lump of skin 04/12/2023    Other seborrheic keratosis 02/28/2017    Personal history of other diseases of the digestive system     History of gastroesophageal reflux (GERD)    Personal history of other mental and behavioral disorders     History of anxiety    Scar condition and fibrosis of skin 02/28/2017    Shortness of breath 03/14/2023    Shortness of breath at rest 02/22/2017     Skin changes due to chronic exposure to nonionizing radiation, unspecified 2017    Wheezing 2017        No Known Allergies     Past Surgical History:   Procedure Laterality Date    OTHER SURGICAL HISTORY  2018    Hernia repair    OTHER SURGICAL HISTORY  2018    Tonsillectomy    OTHER SURGICAL HISTORY  2018    Hip surgery    OTHER SURGICAL HISTORY  2018    Bone transplantation autograft        Family History   Problem Relation Name Age of Onset    Other (SMALL CELL CARCINOMA) Mother      Emphysema Mother      Emphysema Father      Heart failure Father      Emphysema Brother Mayo     Lung cancer Brother Mayo     Coronary artery disease Brother Mayo     Other (cardiac bypass) Brother Mayo     Other (shoulder replacement) Brother Timoteo     Gout Brother Timoteo     Memory loss Brother Manjit     Gout Brother Manjit     Accidental death Brother Amilcar         Social History     Socioeconomic History    Marital status:      Spouse name: Not on file    Number of children: Not on file    Years of education: Not on file    Highest education level: Not on file   Occupational History    Not on file   Tobacco Use    Smoking status: Former     Packs/day: 1.00     Years: 50.00     Additional pack years: 0.00     Total pack years: 50.00     Types: Cigarettes     Quit date: 10/1/2022     Years since quittin.0    Smokeless tobacco: Never   Vaping Use    Vaping Use: Former   Substance and Sexual Activity    Alcohol use: Yes     Alcohol/week: 1.0 standard drink of alcohol     Types: 1 Standard drinks or equivalent per week    Drug use: Not Currently    Sexual activity: Not on file   Other Topics Concern    Not on file   Social History Narrative    Not on file     Social Determinants of Health     Financial Resource Strain: Not on file   Food Insecurity: No Food Insecurity (2023)    Hunger Vital Sign     Worried About Running Out of Food in the Last Year: Never true     Ran Out of Food in  the Last Year: Never true   Transportation Needs: Not on file   Physical Activity: Not on file   Stress: Not on file   Social Connections: Not on file   Intimate Partner Violence: Not on file   Housing Stability: Not on file        CURRENT MEDICATIONS:   Current Outpatient Medications   Medication Sig Dispense Refill    albuterol 90 mcg/actuation inhaler Inhale 1 puff every 4 hours if needed for wheezing or shortness of breath. 18 g 11    ALPRAZolam (Xanax) 0.5 mg tablet Take 1 tablet (0.5 mg) by mouth once daily as needed for anxiety. 30 tablet 0    ascorbic acid (VITAMIN C ORAL) Take by mouth.      ascorbic acid, vitamin C, 500 mg capsule Take by mouth.      azelastine (Astelin) 137 mcg (0.1 %) nasal spray SPRAY 1 SPRAY INTO EACH NOSTRIL IN THE MORNING AND BEFORE BEDTIME AS DIRECTED 30 mL 5    biotin 1 mg capsule Take by mouth.      busPIRone (Buspar) 7.5 mg tablet Take 1 tablet (7.5 mg) by mouth.      CALCIUM CITRATE ORAL Take by mouth.      CALCIUM ORAL Take by mouth.      denosumab (Prolia) 60 mg/mL syringe Inject 1 mL (60 mg) under the skin 1 time for 1 dose. 1 mL 0    docosahexaenoic acid/epa (FISH OIL ORAL) Take by mouth.      ferrous gluconate 256 mg (28 mg iron) tablet Take 1 tablet (28 mg) by mouth.      fluticasone propion-salmeteroL (Wixela Inhub) 250-50 mcg/dose diskus inhaler Inhale 1 puff  in the morning and 1 puff in the evening. 60 each 1    fluticasone propion-salmeteroL (Wixela Inhub) 500-50 mcg/dose diskus inhaler Inhale 1 puff 2 times a day. Rinse mouth with water after use to reduce aftertaste and incidence of candidiasis. Do not swallow. 60 each 11    hydrOXYzine HCL (Atarax) 50 mg tablet TAKE 1 TABLET BY MOUTH ONCE DAILY AT BEDTIME. 30 tablet 5    ibuprofen 200 mg tablet Take 1 tablet (200 mg) by mouth every 2 hours if needed.      MAGNESIUM ORAL Take 200 mg by mouth.      MILK THISTLE ORAL Take 1,000 mg by mouth.      montelukast (Singulair) 10 mg tablet Take 1 tablet (10 mg) by mouth once  "daily at bedtime. 90 tablet 3    rosuvastatin (Crestor) 10 mg tablet Take 1 tablet (10 mg) by mouth once daily. 90 tablet 3    sertraline (Zoloft) 100 mg tablet Take 1 tablet (100 mg) by mouth once daily. 90 tablet 3    tiotropium (Spiriva with HandiHaler) 18 mcg inhalation capsule Place 1 capsule (18 mcg) into inhaler and inhale once daily. 30 capsule 11    traZODone (Desyrel) 50 mg tablet TAKE 1 TABLET BY MOUTH AS NEEDED AT BEDTIME FOR SLEEP. 30 tablet 5    turmeric 400 mg capsule Take by mouth.      VITAMIN B COMPLEX ORAL Take by mouth.      ZINC ORAL Take 100 mg by mouth.       No current facility-administered medications for this visit.        OBJECTIVE    PHYSICAL EXAM      12/16/2021     2:28 PM 9/6/2022     1:35 PM 3/14/2023    10:51 AM 4/4/2023     1:58 PM 4/10/2023     3:35 PM 9/6/2023     3:05 PM 10/11/2023    10:16 AM   Vitals   Systolic 140 122 131 136 113 124    Diastolic 82 80 84 84 75 74    Heart Rate  93 85 84 98 74    Temp  36 °C (96.8 °F) 35.8 °C (96.4 °F) 36 °C (96.8 °F)  35.8 °C (96.4 °F)    Resp    16  16    Height (in)  1.638 m (5' 4.5\") 1.626 m (5' 4\") 1.619 m (5' 3.75\")   1.645 m (5' 4.75\")   Weight (lb)  169 181 180 178.3 181 180   BMI  28.56 kg/m2 31.07 kg/m2 31.14 kg/m2 30.85 kg/m2 31.31 kg/m2 30.19 kg/m2   BSA (m2)  1.87 m2 1.93 m2 1.92 m2 1.91 m2 1.92 m2 1.93 m2   Visit Report   Report Report  Report Report      There is no height or weight on file to calculate BMI.    GENERAL: A/Ox3, NAD. Appears healthy, well nourished  CARDIAC: regular rate  LUNGS: Breathing non-labored    MUSCULOSKELETAL:  Laterality: right Hip exam  - Strength: Abduction 5/5, Flexion 5/5 however hip flexion, abduction and knee extension against resistance are somewhat weaker when compared to contralateral side  - Palpation: non TTP along surgical site  - EHL/PF/DF motor intact  - compartments soft, negative homans  - Gait: using no assistive device    NEUROVASCULAR:  - Neurovascular Status: sensation intact to " light touch distally  - Capillary refill brisk at extremities, Bilateral dorsalis pedis pulse 2+      IMAGING:  Multiple views of the affected right hip(s) demonstrate: Moderate total hip arthroplasty with no signs of loosening failure when compared to prior films.   X-rays were personally reviewed and interpreted by me.  Radiology reports were reviewed by me as well, if readily available at the time.        Corwin Jordan DO  Attending Surgeon  Joint Replacement and Adult Reconstructive Surgery  [place], [place]

## 2023-11-16 ENCOUNTER — EVALUATION (OUTPATIENT)
Dept: PHYSICAL THERAPY | Facility: HOSPITAL | Age: 73
End: 2023-11-16
Payer: MEDICARE

## 2023-11-16 DIAGNOSIS — M25.551 HIP PAIN, RIGHT: ICD-10-CM

## 2023-11-16 DIAGNOSIS — R29.898 WEAKNESS OF LOWER EXTREMITY, UNSPECIFIED LATERALITY: Primary | ICD-10-CM

## 2023-11-16 PROCEDURE — 97110 THERAPEUTIC EXERCISES: CPT | Mod: GP | Performed by: PHYSICAL THERAPIST

## 2023-11-16 PROCEDURE — 97161 PT EVAL LOW COMPLEX 20 MIN: CPT | Mod: GP | Performed by: PHYSICAL THERAPIST

## 2023-11-16 ASSESSMENT — PAIN - FUNCTIONAL ASSESSMENT: PAIN_FUNCTIONAL_ASSESSMENT: 0-10

## 2023-11-16 ASSESSMENT — ENCOUNTER SYMPTOMS
DEPRESSION: 1
LOSS OF SENSATION IN FEET: 1
OCCASIONAL FEELINGS OF UNSTEADINESS: 1

## 2023-11-16 ASSESSMENT — PAIN SCALES - GENERAL: PAINLEVEL_OUTOF10: 0 - NO PAIN

## 2023-11-16 NOTE — PROGRESS NOTES
"Physical Therapy    Physical Therapy Evaluation    Patient Name: Viki Kumar  MRN: [id number]  Today's Date: 2023  Time Calculation  Start Time: 1030  Stop Time: 1130  Time Calculation (min): 60 min  : 1950  Visit: 1   Await auth for future visits.    Assessment  L heel lift: pt. Gait with cont. Listing at trunk but she felt more secure with walking.    PT Assessment Results: Decreased range of motion, Decreased strength, Pain  Rehab Prognosis: Good  Evaluation/Treatment Tolerance: Patient tolerated treatment well    Plan  Treatment/Interventions: Cryotherapy, Hot pack, Education/ Instruction, Self care/ home management, Therapeutic exercises, Therapeutic activities  PT Plan: Skilled PT  Rehab Potential: Good  Plan of Care Agreement: Patient    Current Problem  1. Weakness of lower extremity, unspecified laterality        2. Hip pain, right  Referral to Physical Therapy          Subjective   Pt. States she is with \"memory problems\". Pt. States she is with a R hip replacement 20 years ago. Pt. With R hip issues since she was 6 years with her first hip sx d/t fibrous dysplasia. Pt. Recently is falling a few times a month. Pt. States she gets a pain down the front of her thigh that she felt like the pauline was shifting.   (Chart review states all hardware is stable)  Pt. Does not use a cane or walker, she states boldly she does not have interest in this.   Pt. States she may try a lift for unequal leg length.     Her goal is \" I want to be able to move\".   General:  General  Reason for Referral: intitial eval  Referred By: Dr. Julian DO  Preferred Learning Style: verbal  Precautions:  Precautions  Medical Precautions:  (emphysema, lung disease, HA, Oa, fibrous dysplasia - hips.)       Pain:  Pain Assessment: 0-10  Pain Score: 0 - No pain  Pain Type:  (Pt. states she does not have hip pain; but whole body pain / soreness that is 7/10 at its worst.)  Home Living:  Pt. States she lives with her  " but her daughter is living with her. Pt. States her  is healthy and working. Pt. States she does her housework.   Pt. States she sits a lot during the day.   Pt. States in the summer she is in her swimming pool.    Prior Function Per Pt/Caregiver Report:  Indep.      Objective   Posture:  L pelvic crest lower in stand  R LE shorter in supine and sit by 2-3cm     Range of Motion:  R hip flexion ~100  R hip IR / ER limited by ~60%     Strength:  SUKUMAR LE strength:   Hip flex: L 4+/5 ;    R 4/5   Hip abd: 4/5   Hip add: 4+/5  Knees and ankles wfl   Flexibility:  HS: ~65      Gait:  Pt. Amb. Indep with wide SHAMA, and listing R. ;  With lift pt. With upper trunk listing  With SBQC pt. Amb. ~50 feet with minimal trunk listing.         Other:  Visit1: 11-16-23 HEP and Eval  Issued D3 soft heel lift to L shoe     EXERCISES       Date       VISIT# # # # #    REPS REPS REPS REPS          Nustep              DF str              Gait: with cane              Side amb              3 way kicks              bridges       clamshells                                                                                                                              HEP                 Outcome Measures:  LEFS: 7      OP EDUCATION:  Access Code: [email or web address]  URL: [email or web address]  Date: 11/16/2023  Prepared by: Kelly David    Exercises  - Supine Piriformis Stretch with Leg Straight  - 1 x daily - 7 x weekly - 3 sets - 30sec hold  - Supine Hip External Rotation Stretch  - 1 x daily - 7 x weekly - 3 sets - 30sec hold  - Seated March  - 1 x daily - 7 x weekly - 3 sets - 10 reps  - Seated Long Arc Quad  - 1 x daily - 7 x weekly - 3 sets - 10 reps  - Seated Hip Adduction Isometrics with Ball  - 1 x daily - 7 x weekly - 3 sets - 10 reps  Outpatient Education  Individual(s) Educated: Patient  Education Provided: Home Exercise Program, POC  Risk and Benefits Discussed with Patient/Caregiver/Other: yes  Patient/Caregiver  Demonstrated Understanding: yes  Plan of Care Discussed and Agreed Upon: yes  Patient Response to Education: Patient/Caregiver Verbalized Understanding of Information    Goals:  Active       R hip pain       Pt. will c/o less than 2/10 R LE soreness with walking or standing.       Start:  11/16/23    Expected End:  02/16/24            Pt. will be indep with HEP.        Start:  11/16/23    Expected End:  02/16/24            Pt. will increase amb. to 500 feet with most appropriate assistive device.       Start:  11/16/23    Expected End:  02/16/24            Pt. will increase SUKUMAR LE strength to wnl to increase gait.       Start:  11/16/23    Expected End:  02/16/24

## 2023-12-06 ENCOUNTER — APPOINTMENT (OUTPATIENT)
Dept: PRIMARY CARE | Facility: CLINIC | Age: 73
End: 2023-12-06
Payer: MEDICARE

## 2023-12-13 ENCOUNTER — OFFICE VISIT (OUTPATIENT)
Dept: PRIMARY CARE | Facility: CLINIC | Age: 73
End: 2023-12-13
Payer: MEDICARE

## 2023-12-13 VITALS
WEIGHT: 180 LBS | TEMPERATURE: 97.3 F | OXYGEN SATURATION: 94 % | HEART RATE: 78 BPM | SYSTOLIC BLOOD PRESSURE: 130 MMHG | HEIGHT: 65 IN | BODY MASS INDEX: 29.99 KG/M2 | DIASTOLIC BLOOD PRESSURE: 82 MMHG | RESPIRATION RATE: 18 BRPM

## 2023-12-13 DIAGNOSIS — F33.41 RECURRENT MAJOR DEPRESSIVE DISORDER, IN PARTIAL REMISSION (CMS-HCC): Primary | ICD-10-CM

## 2023-12-13 DIAGNOSIS — J43.9 PULMONARY EMPHYSEMA, UNSPECIFIED EMPHYSEMA TYPE (MULTI): ICD-10-CM

## 2023-12-13 DIAGNOSIS — J30.1 SEASONAL ALLERGIC RHINITIS DUE TO POLLEN: ICD-10-CM

## 2023-12-13 DIAGNOSIS — R15.1 FECAL SMEARING: ICD-10-CM

## 2023-12-13 DIAGNOSIS — R32 URINARY INCONTINENCE, UNSPECIFIED TYPE: ICD-10-CM

## 2023-12-13 DIAGNOSIS — E78.2 MIXED HYPERLIPIDEMIA: ICD-10-CM

## 2023-12-13 PROBLEM — J20.9 ACUTE BRONCHITIS: Status: RESOLVED | Noted: 2017-02-22 | Resolved: 2023-12-13

## 2023-12-13 PROCEDURE — 1160F RVW MEDS BY RX/DR IN RCRD: CPT

## 2023-12-13 PROCEDURE — 99213 OFFICE O/P EST LOW 20 MIN: CPT

## 2023-12-13 PROCEDURE — 1125F AMNT PAIN NOTED PAIN PRSNT: CPT

## 2023-12-13 PROCEDURE — 1036F TOBACCO NON-USER: CPT

## 2023-12-13 PROCEDURE — 1159F MED LIST DOCD IN RCRD: CPT

## 2023-12-13 RX ORDER — FLUTICASONE PROPIONATE AND SALMETEROL 500; 50 UG/1; UG/1
1 POWDER RESPIRATORY (INHALATION)
Qty: 60 EACH | Refills: 11 | Status: SHIPPED | OUTPATIENT
Start: 2023-12-13 | End: 2024-06-06 | Stop reason: SDUPTHER

## 2023-12-13 RX ORDER — ROSUVASTATIN CALCIUM 10 MG/1
10 TABLET, COATED ORAL DAILY
Qty: 90 TABLET | Refills: 3 | Status: SHIPPED | OUTPATIENT
Start: 2023-12-13 | End: 2024-12-12

## 2023-12-13 RX ORDER — ALBUTEROL SULFATE 0.83 MG/ML
2.5 SOLUTION RESPIRATORY (INHALATION) 4 TIMES DAILY PRN
Qty: 100 ML | Refills: 11 | Status: SHIPPED | OUTPATIENT
Start: 2023-12-13 | End: 2024-12-12

## 2023-12-13 RX ORDER — MONTELUKAST SODIUM 10 MG/1
10 TABLET ORAL NIGHTLY
Qty: 90 TABLET | Refills: 3 | Status: SHIPPED | OUTPATIENT
Start: 2023-12-13 | End: 2024-06-06 | Stop reason: ALTCHOICE

## 2023-12-13 RX ORDER — ARIPIPRAZOLE 2 MG/1
2 TABLET ORAL DAILY
Qty: 30 TABLET | Refills: 0 | Status: SHIPPED | OUTPATIENT
Start: 2023-12-13 | End: 2024-01-08

## 2023-12-13 SDOH — ECONOMIC STABILITY: FOOD INSECURITY: WITHIN THE PAST 12 MONTHS, THE FOOD YOU BOUGHT JUST DIDN'T LAST AND YOU DIDN'T HAVE MONEY TO GET MORE.: NEVER TRUE

## 2023-12-13 SDOH — ECONOMIC STABILITY: FOOD INSECURITY: WITHIN THE PAST 12 MONTHS, YOU WORRIED THAT YOUR FOOD WOULD RUN OUT BEFORE YOU GOT MONEY TO BUY MORE.: NEVER TRUE

## 2023-12-13 ASSESSMENT — LIFESTYLE VARIABLES
HOW OFTEN DO YOU HAVE SIX OR MORE DRINKS ON ONE OCCASION: NEVER
HOW MANY STANDARD DRINKS CONTAINING ALCOHOL DO YOU HAVE ON A TYPICAL DAY: PATIENT DOES NOT DRINK
AUDIT-C TOTAL SCORE: 1
SKIP TO QUESTIONS 9-10: 1
HOW OFTEN DO YOU HAVE A DRINK CONTAINING ALCOHOL: MONTHLY OR LESS

## 2023-12-13 ASSESSMENT — ENCOUNTER SYMPTOMS
DEPRESSION: 1
OCCASIONAL FEELINGS OF UNSTEADINESS: 1
EYES NEGATIVE: 1
HEMATOLOGIC/LYMPHATIC NEGATIVE: 1
LOSS OF SENSATION IN FEET: 0
ENDOCRINE NEGATIVE: 1
NEUROLOGICAL NEGATIVE: 1
MUSCULOSKELETAL NEGATIVE: 1
CARDIOVASCULAR NEGATIVE: 1
PSYCHIATRIC NEGATIVE: 1
RESPIRATORY NEGATIVE: 1
CONSTITUTIONAL NEGATIVE: 1
GASTROINTESTINAL NEGATIVE: 1

## 2023-12-13 ASSESSMENT — PATIENT HEALTH QUESTIONNAIRE - PHQ9
2. FEELING DOWN, DEPRESSED OR HOPELESS: SEVERAL DAYS
6. FEELING BAD ABOUT YOURSELF - OR THAT YOU ARE A FAILURE OR HAVE LET YOURSELF OR YOUR FAMILY DOWN: SEVERAL DAYS
7. TROUBLE CONCENTRATING ON THINGS, SUCH AS READING THE NEWSPAPER OR WATCHING TELEVISION: NEARLY EVERY DAY
10. IF YOU CHECKED OFF ANY PROBLEMS, HOW DIFFICULT HAVE THESE PROBLEMS MADE IT FOR YOU TO DO YOUR WORK, TAKE CARE OF THINGS AT HOME, OR GET ALONG WITH OTHER PEOPLE: VERY DIFFICULT
5. POOR APPETITE OR OVEREATING: NOT AT ALL
8. MOVING OR SPEAKING SO SLOWLY THAT OTHER PEOPLE COULD HAVE NOTICED. OR THE OPPOSITE, BEING SO FIGETY OR RESTLESS THAT YOU HAVE BEEN MOVING AROUND A LOT MORE THAN USUAL: NEARLY EVERY DAY
SUM OF ALL RESPONSES TO PHQ9 QUESTIONS 1 & 2: 2
9. THOUGHTS THAT YOU WOULD BE BETTER OFF DEAD, OR OF HURTING YOURSELF: NOT AT ALL
4. FEELING TIRED OR HAVING LITTLE ENERGY: NEARLY EVERY DAY
2. FEELING DOWN, DEPRESSED OR HOPELESS: SEVERAL DAYS
SUM OF ALL RESPONSES TO PHQ QUESTIONS 1-9: 17
3. TROUBLE FALLING OR STAYING ASLEEP OR SLEEPING TOO MUCH: NEARLY EVERY DAY
1. LITTLE INTEREST OR PLEASURE IN DOING THINGS: SEVERAL DAYS
10. IF YOU CHECKED OFF ANY PROBLEMS, HOW DIFFICULT HAVE THESE PROBLEMS MADE IT FOR YOU TO DO YOUR WORK, TAKE CARE OF THINGS AT HOME, OR GET ALONG WITH OTHER PEOPLE: SOMEWHAT DIFFICULT
SUM OF ALL RESPONSES TO PHQ9 QUESTIONS 1 AND 2: 4
1. LITTLE INTEREST OR PLEASURE IN DOING THINGS: NEARLY EVERY DAY

## 2023-12-13 ASSESSMENT — PAIN SCALES - GENERAL: PAINLEVEL: 4

## 2023-12-13 NOTE — PROGRESS NOTES
Subjective   Patient ID: Viki Kumar is a 73 y.o. female who presents for 3 month follow up.    Ortho hand- fingers were locking up at last visit. Injected by Dr. Portillo on 9/28. Fingers moving well, no more pain.   Falling at home, referred to ortho for hip replacement evaluation. Seen by Dr. Jordan in ortho on 10/30 who felt joint was stable, advised to complete course of PT. Went to one session of PT who gave her a lift for her shoe, which she is wearing sometimes but states it's uncomfortable  Wixela appears to be working alright. She endorses shortness of breath after walking 50 feet but didn't notice much of an improvement with using Trelegy (cost is also an issue).     Diet: Eating a good amount of veggies, fruits and protein. Ice cream nightly, 1-2 big can soda per day Drinking half per sitting. Drinking 2 cups coffee per day  Exercise: No regular exercise due to back pain and balance issues  Weight: Stable  Water: Drinking 6-8 bottles per day   Sleep: Bad sleep, thinks she might be getting too much sleep. Napping during the day. Goes to bed around 9P, sometimes already in bed. Waking up around 8:30AM, waking up frequently through the night. Has to urinate overnight  Social: , son and her  live with her  Professional: Retired /    Review of Systems   Constitutional: Negative.    HENT: Negative.     Eyes: Negative.    Respiratory: Negative.     Cardiovascular: Negative.    Gastrointestinal: Negative.    Endocrine: Negative.    Genitourinary: Negative.    Musculoskeletal: Negative.    Skin: Negative.    Neurological: Negative.    Hematological: Negative.    Psychiatric/Behavioral: Negative.          Current Outpatient Medications   Medication Sig Dispense Refill    ALPRAZolam (Xanax) 0.5 mg tablet Take 1 tablet (0.5 mg) by mouth once daily as needed for anxiety. 30 tablet 0    ascorbic acid (VITAMIN C ORAL) Take by mouth.      azelastine (Astelin) 137 mcg (0.1 %)  nasal spray SPRAY 1 SPRAY INTO EACH NOSTRIL IN THE MORNING AND BEFORE BEDTIME AS DIRECTED 30 mL 5    biotin 1 mg capsule Take by mouth.      busPIRone (Buspar) 7.5 mg tablet Take 1 tablet (7.5 mg) by mouth.      CALCIUM CITRATE ORAL Take by mouth.      docosahexaenoic acid/epa (FISH OIL ORAL) Take by mouth.      ferrous gluconate 256 mg (28 mg iron) tablet Take 1 tablet (28 mg) by mouth.      hydrOXYzine HCL (Atarax) 50 mg tablet TAKE 1 TABLET BY MOUTH ONCE DAILY AT BEDTIME. 30 tablet 5    ibuprofen 200 mg tablet Take 1 tablet (200 mg) by mouth every 2 hours if needed.      MAGNESIUM ORAL Take 200 mg by mouth.      MILK THISTLE ORAL Take 1,000 mg by mouth.      tiotropium (Spiriva with HandiHaler) 18 mcg inhalation capsule Place 1 capsule (18 mcg) into inhaler and inhale once daily. 30 capsule 11    traZODone (Desyrel) 50 mg tablet TAKE 1 TABLET BY MOUTH AS NEEDED AT BEDTIME FOR SLEEP. 30 tablet 5    turmeric 400 mg capsule Take by mouth.      VITAMIN B COMPLEX ORAL Take by mouth.      ZINC ORAL Take 100 mg by mouth.      albuterol 2.5 mg /3 mL (0.083 %) nebulizer solution Take 3 mL (2.5 mg) by nebulization 4 times a day as needed for wheezing or shortness of breath. 100 mL 11    albuterol 90 mcg/actuation inhaler Inhale 1 puff every 4 hours if needed for wheezing or shortness of breath. 18 g 11    ARIPiprazole (Abilify) 2 mg tablet Take 1 tablet (2 mg) by mouth once daily. 30 tablet 0    denosumab (Prolia) 60 mg/mL syringe Inject 1 mL (60 mg) under the skin 1 time for 1 dose. 1 mL 0    fluticasone propion-salmeteroL (Wixela Inhub) 500-50 mcg/dose diskus inhaler Inhale 1 puff 2 times a day. Rinse mouth with water after use to reduce aftertaste and incidence of candidiasis. Do not swallow. 60 each 11    montelukast (Singulair) 10 mg tablet Take 1 tablet (10 mg) by mouth once daily at bedtime. 90 tablet 3    rosuvastatin (Crestor) 10 mg tablet Take 1 tablet (10 mg) by mouth once daily. 90 tablet 3    sertraline  "(Zoloft) 100 mg tablet Take 1 tablet (100 mg) by mouth once daily. 90 tablet 3     No current facility-administered medications for this visit.     Past Surgical History:   Procedure Laterality Date    OTHER SURGICAL HISTORY  2018    Hernia repair    OTHER SURGICAL HISTORY  2018    Tonsillectomy    OTHER SURGICAL HISTORY  2018    Hip surgery    OTHER SURGICAL HISTORY  2018    Bone transplantation autograft     Family History   Problem Relation Name Age of Onset    Other (SMALL CELL CARCINOMA) Mother      Emphysema Mother      Emphysema Father      Heart failure Father      Emphysema Brother Mayo     Lung cancer Brother Mayo     Coronary artery disease Brother Mayo     Other (cardiac bypass) Brother Mayo     Other (shoulder replacement) Brother Timoteo     Gout Brother Timoteo     Memory loss Brother Manjit     Gout Brother Manjit     Accidental death Brother Amilcar       Social History     Tobacco Use    Smoking status: Former     Packs/day: 1.00     Years: 50.00     Additional pack years: 0.00     Total pack years: 50.00     Types: Cigarettes     Quit date: 10/1/2022     Years since quittin.2    Smokeless tobacco: Never   Vaping Use    Vaping Use: Former   Substance Use Topics    Alcohol use: Yes     Alcohol/week: 1.0 standard drink of alcohol     Types: 1 Standard drinks or equivalent per week    Drug use: Yes     Comment: CBD gummies        Objective     Visit Vitals  /82 (BP Location: Left arm, Patient Position: Sitting, BP Cuff Size: Large adult)   Pulse 78   Temp 36.3 °C (97.3 °F)   Resp 18   Ht 1.645 m (5' 4.75\")   Wt 81.6 kg (180 lb)   SpO2 94%   BMI 30.19 kg/m²   Smoking Status Former   BSA 1.93 m²        Physical Exam  Constitutional:       Appearance: Normal appearance.   HENT:      Head: Normocephalic and atraumatic.   Eyes:      Extraocular Movements: Extraocular movements intact.      Pupils: Pupils are equal, round, and reactive to light.   Cardiovascular:      Rate and " Rhythm: Normal rate and regular rhythm.   Pulmonary:      Effort: Pulmonary effort is normal.      Breath sounds: Normal breath sounds.   Abdominal:      General: Abdomen is flat. Bowel sounds are normal.      Palpations: Abdomen is soft.   Musculoskeletal:         General: Normal range of motion.   Skin:     General: Skin is warm and dry.      Capillary Refill: Capillary refill takes less than 2 seconds.   Neurological:      General: No focal deficit present.      Mental Status: She is alert and oriented to person, place, and time.   Psychiatric:         Mood and Affect: Mood normal.         Behavior: Behavior normal.           Assessment/Plan   Problem List Items Addressed This Visit       Chronic obstructive pulmonary disease (CMS/HCC)     Doing well on Wixela 500-50, refill albuterol nebs to use as needed         Relevant Medications    fluticasone propion-salmeteroL (Wixela Inhub) 500-50 mcg/dose diskus inhaler    albuterol 2.5 mg /3 mL (0.083 %) nebulizer solution    Fecal incontinence    Relevant Orders    Referral to Colorectal Surgery    Hyperlipidemia    Relevant Medications    rosuvastatin (Crestor) 10 mg tablet    Seasonal allergic rhinitis due to pollen    Relevant Medications    montelukast (Singulair) 10 mg tablet    Depression - Primary     PHQ score of 17, some worsening depression  Has been maintained on sertraline 100mg daily longstanding  Some concerns that she may have bipolar, not following with psychiatry    Start aripiprazole 2mg daily; continue sertraline 100mg daily         Relevant Medications    ARIPiprazole (Abilify) 2 mg tablet     Other Visit Diagnoses       Urinary incontinence, unspecified type        Interested in bladder sling, persistent urinary incontinence  Refer to urogynecology with Dr. Patton    Relevant Orders    Referral to Urogynecology            All pertinent lab work and results were reviewed with patient.     Follow up with me in 3-4 months    Jeanna French,  APRN-CNS

## 2023-12-13 NOTE — PATIENT INSTRUCTIONS
Thank you for coming to see me today.  If you have any questions or concerns following our visit, please contact the office.  Phone: [phone]    Follow up with me in 3-4 months for depression follow up    1)  START aripiprazole 2mg daily, continue sertraline 100mg daily    2) I am referring you to urogynecology with Dr. Patton and colorectal surgery for evaluation of fecal incontinence - please call [phone] to schedule an appointment or stop to 's office (in the lab) on your way out today

## 2023-12-13 NOTE — ASSESSMENT & PLAN NOTE
PHQ score of 17, some worsening depression  Has been maintained on sertraline 100mg daily longstanding  Some concerns that she may have bipolar, not following with psychiatry    Start aripiprazole 2mg daily; continue sertraline 100mg daily

## 2024-01-06 DIAGNOSIS — F33.41 RECURRENT MAJOR DEPRESSIVE DISORDER, IN PARTIAL REMISSION (CMS-HCC): ICD-10-CM

## 2024-01-08 RX ORDER — ARIPIPRAZOLE 2 MG/1
2 TABLET ORAL DAILY
Qty: 90 TABLET | Refills: 1 | Status: SHIPPED | OUTPATIENT
Start: 2024-01-08 | End: 2024-06-06 | Stop reason: ALTCHOICE

## 2024-02-13 ENCOUNTER — OFFICE VISIT (OUTPATIENT)
Dept: UROLOGY | Facility: CLINIC | Age: 74
End: 2024-02-13
Payer: MEDICARE

## 2024-02-13 VITALS — SYSTOLIC BLOOD PRESSURE: 175 MMHG | DIASTOLIC BLOOD PRESSURE: 83 MMHG

## 2024-02-13 DIAGNOSIS — N39.3 SUI (STRESS URINARY INCONTINENCE, FEMALE): ICD-10-CM

## 2024-02-13 DIAGNOSIS — R32 URINARY INCONTINENCE, UNSPECIFIED TYPE: Primary | ICD-10-CM

## 2024-02-13 LAB
POC APPEARANCE, URINE: CLEAR
POC BILIRUBIN, URINE: NEGATIVE
POC BLOOD, URINE: NEGATIVE
POC COLOR, URINE: YELLOW
POC GLUCOSE, URINE: NEGATIVE MG/DL
POC KETONES, URINE: NEGATIVE MG/DL
POC LEUKOCYTES, URINE: NEGATIVE
POC NITRITE,URINE: NEGATIVE
POC PH, URINE: 6 PH
POC PROTEIN, URINE: NEGATIVE MG/DL
POC SPECIFIC GRAVITY, URINE: >=1.03
POC UROBILINOGEN, URINE: 0.2 EU/DL

## 2024-02-13 PROCEDURE — 51798 US URINE CAPACITY MEASURE: CPT | Performed by: STUDENT IN AN ORGANIZED HEALTH CARE EDUCATION/TRAINING PROGRAM

## 2024-02-13 PROCEDURE — 1125F AMNT PAIN NOTED PAIN PRSNT: CPT | Performed by: STUDENT IN AN ORGANIZED HEALTH CARE EDUCATION/TRAINING PROGRAM

## 2024-02-13 PROCEDURE — 1036F TOBACCO NON-USER: CPT | Performed by: STUDENT IN AN ORGANIZED HEALTH CARE EDUCATION/TRAINING PROGRAM

## 2024-02-13 PROCEDURE — 99205 OFFICE O/P NEW HI 60 MIN: CPT | Performed by: STUDENT IN AN ORGANIZED HEALTH CARE EDUCATION/TRAINING PROGRAM

## 2024-02-13 PROCEDURE — 1123F ACP DISCUSS/DSCN MKR DOCD: CPT | Performed by: STUDENT IN AN ORGANIZED HEALTH CARE EDUCATION/TRAINING PROGRAM

## 2024-02-13 NOTE — PROGRESS NOTES
Referred by: Dr. French      PCP  Jeanna French, APRN-CNS         CHIEF COMPLAINT:  Urge incontinence         HISTORY OF PRESENT ILLNESS:  This is a  74 y.o. y.o. who presents on 2/13/24 for an initial evaluation. She leaks with cough, sneeze, laugh, and urge. She's emptying her bowels at least 4-5 times a day. She has not had any treatment or gone to physical therapy for this. She has 3 children, all born vaginally. She states that she feels a bladder bulge when she's going over a bridge.     She is not sexually active at this time.          Past Medical History  She has a past medical history of Actinic keratosis (02/28/2017), Arthralgia (01/20/2023), Bipolar disorder, unspecified (CMS/[facility]), Chronic obstructive pulmonary disease (CMS/[facility]) (01/20/2023), COPD (chronic obstructive pulmonary disease) with acute bronchitis (CMS/[facility]) (04/04/2023), Fecal incontinence (01/20/2023), Hemoptysis (04/12/2023), Lump of skin (04/12/2023), Other seborrheic keratosis (02/28/2017), Personal history of other diseases of the digestive system, Personal history of other mental and behavioral disorders, Scar condition and fibrosis of skin (02/28/2017), Shortness of breath (03/14/2023), Shortness of breath at rest (02/22/2017), Skin changes due to chronic exposure to nonionizing radiation, unspecified (02/28/2017), and Wheezing (02/22/2017).    Surgical History  She has a past surgical history that includes Other surgical history (12/28/2018); Other surgical history (12/28/2018); Other surgical history (12/28/2018); and Other surgical history (12/31/2018).     Social History  She reports that she quit smoking about 16 months ago. Her smoking use included cigarettes. She has a 50.00 pack-year smoking history. She has never used smokeless tobacco. She reports current alcohol use of about 1.0 standard drink of alcohol per week. She reports current drug use.    Family History  Family History   Problem Relation Name Age of Onset     "Other (SMALL CELL CARCINOMA) Mother      Emphysema Mother      Emphysema Father      Heart failure Father      Emphysema Brother Mayo     Lung cancer Brother Mayo     Coronary artery disease Brother Mayo     Other (cardiac bypass) Brother Mayo     Other (shoulder replacement) Brother Timoteo     Gout Brother Timoteo     Memory loss Brother Manjit     Gout Brother Manjit     Accidental death Brother Amilcar         Allergies  Patient has no known allergies.        A comprehensive 10+ review of systems was negative except for: see hpi                    PHYSICAL EXAMINATION:  BP Readings from Last 3 Encounters:   12/13/23 130/82   09/06/23 124/74   04/10/23 113/75      Wt Readings from Last 3 Encounters:   12/13/23 81.6 kg (180 lb)   10/30/23 82.1 kg (181 lb)   10/11/23 81.6 kg (180 lb)      BMI: Estimated body mass index is 30.19 kg/m² as calculated from the following:    Height as of 12/13/23: 1.645 m (5' 4.75\").    Weight as of 12/13/23: 81.6 kg (180 lb).  BSA: Estimated body surface area is 1.93 meters squared as calculated from the following:    Height as of 12/13/23: 1.645 m (5' 4.75\").    Weight as of 12/13/23: 81.6 kg (180 lb).  HEENT: Normocephalic, atraumatic, PER EOMI, nonicteric, trachea normal, thyroid normal, oropharynx normal.  CARDIAC: regular rate & rhythm, S1 & S2 normal.  No heaves, thrills, gallops or murmurs.  LUNGS: Clear to auscultation, no spinal or CV tenderness.  EXTREMITIES: No evidence of cyanosis, clubbing or edema.      Pelvic:  Genitourinary:  normal external genitalia, Bartholin's glands negative, [place]'s glands negative  Urethra   normal meatus, non-tender, no periurethral mass  Vaginal mucosa  normal  Cervix surgically absent normal  Uterus surgically absent normal size, nontender  Adnexae  negative nontender, no masses  Atrophy negative    CST positive  Pelvic floor muscle contraction  4/5    POP-Q (in supine position):        Aa -3     Ba -3     C -8              gh 3     pb 3     tvl 8     "          Ap -3     Bp -3     D -8    Rectal: no hemorrhoids, fissures or masses          IMPRESSION AND PLAN:  Viki Kumar is a 74 y.o. who presents with urgency incontinence, fecal incontinence mixed incontinence with positive CST and fecal incontinence      SHIELA    -discussed mechanism of UUI and SIMON, and treatment options for both including PFT, pessary, sling for SIMON and PFT, pharmacotherapy and third-line therapy for OAB    We discussed botox vs sacral neuromodulation: both have similar efficacy 80% patients reports >50% improvement, botox associated with 5% risk of incomplete emptying, increase in UTI and will require re-injection in 6-9 months; and as early as 3 months. SNM is a staged procedure, 2 weeks apart, consisting first of lead implantation then internalization of IPG if there is improvement. Interstim is associated with lead migration, explantation, infection and bleeding, though risks are all <5%. We also discussed PTNS which is associated with success rates comparable to medical therapy but without side-effects without significant major morbidity.      We discussed the sling procedure in depth with her there is a long-term success rate of 70-80% complete continence and up to 90% significant improvement up to 10 years after surgery, though the sling is meant to last a lifetime, there is a <5% risk of subsequent surgery, either revision or excision within 9 years. The major complications include bladder perforation with sling placement <1%, retention requiring sling lysis 1-3%, transient retention requiring 2-3 days of catheter drainage 33%, and mesh erosion 1-3%.     Trial of Myrbetriq    Wants to proceed with a sling as well    Fecal incontinence:  Will start Metamucil    Follow up in 6-8 weeks        All questions and concerns were answered and addressed.  The patient expressed understanding and agrees with the plan.     2/13/2024     By signing my name below, Glorai LAL Scribe    attest that this documentation has been prepared under the direction and in the presence of Dr. Manjit Patton.

## 2024-02-13 NOTE — LETTER
February 15, 2024     [facility]  [address]  [address]  [facility] [address]    Patient: Viki Kumar   YOB: 1950   Date of Visit: 2/13/2024       Dear Dr. Jeanna French APRN-CNS:    Thank you for referring Viki Kumar to me for evaluation. Below are my notes for this consultation.  If you have questions, please do not hesitate to call me. I look forward to following your patient along with you.       Sincerely,     Manjit Patton MD      CC: No Recipients  ______________________________________________________________________________________    Referred by: Dr. French      PCP  CLAY WallerKYA         CHIEF COMPLAINT:  Urge incontinence         HISTORY OF PRESENT ILLNESS:  This is a  74 y.o. y.o. who presents on 2/13/24 for an initial evaluation. She leaks with cough, sneeze, laugh, and urge. She's emptying her bowels at least 4-5 times a day. She has not had any treatment or gone to physical therapy for this. She has 3 children, all born vaginally. She states that she feels a bladder bulge when she's going over a bridge.     She is not sexually active at this time.          Past Medical History  She has a past medical history of Actinic keratosis (02/28/2017), Arthralgia (01/20/2023), Bipolar disorder, unspecified (CMS/[facility]), Chronic obstructive pulmonary disease (CMS/[facility]) (01/20/2023), COPD (chronic obstructive pulmonary disease) with acute bronchitis (CMS/[facility]) (04/04/2023), Fecal incontinence (01/20/2023), Hemoptysis (04/12/2023), Lump of skin (04/12/2023), Other seborrheic keratosis (02/28/2017), Personal history of other diseases of the digestive system, Personal history of other mental and behavioral disorders, Scar condition and fibrosis of skin (02/28/2017), Shortness of breath (03/14/2023), Shortness of breath at rest (02/22/2017), Skin changes due to chronic exposure to nonionizing radiation, unspecified (02/28/2017), and Wheezing  "(02/22/2017).    Surgical History  She has a past surgical history that includes Other surgical history (12/28/2018); Other surgical history (12/28/2018); Other surgical history (12/28/2018); and Other surgical history (12/31/2018).     Social History  She reports that she quit smoking about 16 months ago. Her smoking use included cigarettes. She has a 50.00 pack-year smoking history. She has never used smokeless tobacco. She reports current alcohol use of about 1.0 standard drink of alcohol per week. She reports current drug use.    Family History  Family History   Problem Relation Name Age of Onset   • Other (SMALL CELL CARCINOMA) Mother     • Emphysema Mother     • Emphysema Father     • Heart failure Father     • Emphysema Brother Mayo    • Lung cancer Brother Mayo    • Coronary artery disease Brother Mayo    • Other (cardiac bypass) Brother Mayo    • Other (shoulder replacement) Brother Timoteo    • Gout Brother Timoteo    • Memory loss Brother Manjit    • Gout Brother Manjit    • Accidental death Brother Amilcar         Allergies  Patient has no known allergies.        A comprehensive 10+ review of systems was negative except for: see hpi                    PHYSICAL EXAMINATION:  BP Readings from Last 3 Encounters:   12/13/23 130/82   09/06/23 124/74   04/10/23 113/75      Wt Readings from Last 3 Encounters:   12/13/23 81.6 kg (180 lb)   10/30/23 82.1 kg (181 lb)   10/11/23 81.6 kg (180 lb)      BMI: Estimated body mass index is 30.19 kg/m² as calculated from the following:    Height as of 12/13/23: 1.645 m (5' 4.75\").    Weight as of 12/13/23: 81.6 kg (180 lb).  BSA: Estimated body surface area is 1.93 meters squared as calculated from the following:    Height as of 12/13/23: 1.645 m (5' 4.75\").    Weight as of 12/13/23: 81.6 kg (180 lb).  HEENT: Normocephalic, atraumatic, PER EOMI, nonicteric, trachea normal, thyroid normal, oropharynx normal.  CARDIAC: regular rate & rhythm, S1 & S2 normal.  No heaves, thrills, " gallops or murmurs.  LUNGS: Clear to auscultation, no spinal or CV tenderness.  EXTREMITIES: No evidence of cyanosis, clubbing or edema.      Pelvic:  Genitourinary:  normal external genitalia, Bartholin's glands negative, [place]'s glands negative  Urethra   normal meatus, non-tender, no periurethral mass  Vaginal mucosa  normal  Cervix surgically absent normal  Uterus surgically absent normal size, nontender  Adnexae  negative nontender, no masses  Atrophy negative    CST positive  Pelvic floor muscle contraction  4/5    POP-Q (in supine position):        Aa -3     Ba -3     C -8              gh 3     pb 3     tvl 8              Ap -3     Bp -3     D -8    Rectal: no hemorrhoids, fissures or masses          IMPRESSION AND PLAN:  Viki SWANN José is a 74 y.o. who presents with urgency incontinence, fecal incontinence mixed incontinence with positive CST and fecal incontinence      SHIELA    -discussed mechanism of UUI and SIMON, and treatment options for both including PFT, pessary, sling for SIMON and PFT, pharmacotherapy and third-line therapy for OAB    We discussed botox vs sacral neuromodulation: both have similar efficacy 80% patients reports >50% improvement, botox associated with 5% risk of incomplete emptying, increase in UTI and will require re-injection in 6-9 months; and as early as 3 months. SNM is a staged procedure, 2 weeks apart, consisting first of lead implantation then internalization of IPG if there is improvement. Interstim is associated with lead migration, explantation, infection and bleeding, though risks are all <5%. We also discussed PTNS which is associated with success rates comparable to medical therapy but without side-effects without significant major morbidity.      We discussed the sling procedure in depth with her there is a long-term success rate of 70-80% complete continence and up to 90% significant improvement up to 10 years after surgery, though the sling is meant to last a  lifetime, there is a <5% risk of subsequent surgery, either revision or excision within 9 years. The major complications include bladder perforation with sling placement <1%, retention requiring sling lysis 1-3%, transient retention requiring 2-3 days of catheter drainage 33%, and mesh erosion 1-3%.     Trial of Myrbetriq    Wants to proceed with a sling as well    Fecal incontinence:  Will start Metamucil    Follow up in 6-8 weeks        All questions and concerns were answered and addressed.  The patient expressed understanding and agrees with the plan.     2/13/2024     By signing my name below, IGloria Scribe   attest that this documentation has been prepared under the direction and in the presence of Dr. Manjit Patton.

## 2024-02-15 PROBLEM — N39.3 SUI (STRESS URINARY INCONTINENCE, FEMALE): Status: ACTIVE | Noted: 2024-02-13

## 2024-02-15 RX ORDER — CELECOXIB 400 MG/1
400 CAPSULE ORAL ONCE
Status: CANCELLED | OUTPATIENT
Start: 2024-02-15 | End: 2024-02-15

## 2024-02-15 RX ORDER — ACETAMINOPHEN 325 MG/1
975 TABLET ORAL ONCE
Status: CANCELLED | OUTPATIENT
Start: 2024-02-15 | End: 2024-02-15

## 2024-03-15 ENCOUNTER — TELEPHONE (OUTPATIENT)
Dept: PRIMARY CARE | Facility: CLINIC | Age: 74
End: 2024-03-15
Payer: MEDICARE

## 2024-03-15 NOTE — TELEPHONE ENCOUNTER
Patient called stating she was to get a fecal smear and she knows nothing about it or going to see a surgeon for fecal incontinence she was last seen in Dec

## 2024-03-18 NOTE — TELEPHONE ENCOUNTER
I placed referral to colorectal surgery FOR fecal smearing, that's the referring diagnosis. If she wants to go she can do so, if she'd like to hold off that's fine too. Could also have her see Dr. Frederick in general surgery for evaluation

## 2024-03-20 ENCOUNTER — APPOINTMENT (OUTPATIENT)
Dept: CARDIOLOGY | Facility: HOSPITAL | Age: 74
End: 2024-03-20
Payer: MEDICARE

## 2024-03-20 ENCOUNTER — PHARMACY VISIT (OUTPATIENT)
Dept: PHARMACY | Facility: CLINIC | Age: 74
End: 2024-03-20
Payer: COMMERCIAL

## 2024-03-20 ENCOUNTER — APPOINTMENT (OUTPATIENT)
Dept: RADIOLOGY | Facility: HOSPITAL | Age: 74
End: 2024-03-20
Payer: MEDICARE

## 2024-03-20 ENCOUNTER — HOSPITAL ENCOUNTER (EMERGENCY)
Facility: HOSPITAL | Age: 74
Discharge: HOME | End: 2024-03-20
Payer: MEDICARE

## 2024-03-20 VITALS
WEIGHT: 185 LBS | HEART RATE: 82 BPM | DIASTOLIC BLOOD PRESSURE: 82 MMHG | RESPIRATION RATE: 18 BRPM | SYSTOLIC BLOOD PRESSURE: 135 MMHG | HEIGHT: 64 IN | TEMPERATURE: 97.4 F | OXYGEN SATURATION: 94 % | BODY MASS INDEX: 31.58 KG/M2

## 2024-03-20 DIAGNOSIS — S43.492A OTHER SPRAIN OF LEFT SHOULDER JOINT, INITIAL ENCOUNTER: Primary | ICD-10-CM

## 2024-03-20 DIAGNOSIS — S13.9XXA NECK SPRAIN, INITIAL ENCOUNTER: ICD-10-CM

## 2024-03-20 LAB
ATRIAL RATE: 90 BPM
P AXIS: 66 DEGREES
PR INTERVAL: 137 MS
Q ONSET: 254 MS
QRS COUNT: 14 BEATS
QRS DURATION: 78 MS
QT INTERVAL: 362 MS
QTC CALCULATION(BAZETT): 446 MS
QTC FREDERICIA: 416 MS
R AXIS: 5 DEGREES
T AXIS: 18 DEGREES
T OFFSET: 435 MS
VENTRICULAR RATE: 91 BPM

## 2024-03-20 PROCEDURE — 2500000004 HC RX 250 GENERAL PHARMACY W/ HCPCS (ALT 636 FOR OP/ED): Performed by: NURSE PRACTITIONER

## 2024-03-20 PROCEDURE — 73030 X-RAY EXAM OF SHOULDER: CPT | Mod: LEFT SIDE | Performed by: RADIOLOGY

## 2024-03-20 PROCEDURE — 93005 ELECTROCARDIOGRAM TRACING: CPT

## 2024-03-20 PROCEDURE — 99283 EMERGENCY DEPT VISIT LOW MDM: CPT | Mod: 25

## 2024-03-20 PROCEDURE — RXMED WILLOW AMBULATORY MEDICATION CHARGE

## 2024-03-20 PROCEDURE — 96372 THER/PROPH/DIAG INJ SC/IM: CPT

## 2024-03-20 PROCEDURE — 73030 X-RAY EXAM OF SHOULDER: CPT | Mod: LT

## 2024-03-20 RX ORDER — CYCLOBENZAPRINE HCL 10 MG
10 TABLET ORAL 3 TIMES DAILY PRN
Qty: 17 TABLET | Refills: 0 | Status: SHIPPED | OUTPATIENT
Start: 2024-03-20 | End: 2024-06-06 | Stop reason: ALTCHOICE

## 2024-03-20 RX ORDER — LIDOCAINE 50 MG/G
1 PATCH TOPICAL DAILY
Qty: 5 PATCH | Refills: 0 | Status: SHIPPED | OUTPATIENT
Start: 2024-03-20 | End: 2024-06-06 | Stop reason: ALTCHOICE

## 2024-03-20 RX ORDER — ORPHENADRINE CITRATE 30 MG/ML
60 INJECTION INTRAMUSCULAR; INTRAVENOUS ONCE
Status: COMPLETED | OUTPATIENT
Start: 2024-03-20 | End: 2024-03-20

## 2024-03-20 RX ORDER — MELOXICAM 15 MG/1
15 TABLET ORAL DAILY
Qty: 10 TABLET | Refills: 0 | Status: SHIPPED | OUTPATIENT
Start: 2024-03-20 | End: 2024-06-06 | Stop reason: ALTCHOICE

## 2024-03-20 RX ORDER — KETOROLAC TROMETHAMINE 30 MG/ML
15 INJECTION, SOLUTION INTRAMUSCULAR; INTRAVENOUS ONCE
Status: COMPLETED | OUTPATIENT
Start: 2024-03-20 | End: 2024-03-20

## 2024-03-20 RX ADMIN — KETOROLAC TROMETHAMINE 15 MG: 30 INJECTION, SOLUTION INTRAMUSCULAR; INTRAVENOUS at 07:01

## 2024-03-20 RX ADMIN — ORPHENADRINE CITRATE 60 MG: 60 INJECTION INTRAMUSCULAR; INTRAVENOUS at 07:00

## 2024-03-20 ASSESSMENT — PAIN DESCRIPTION - DESCRIPTORS: DESCRIPTORS: DULL

## 2024-03-20 ASSESSMENT — LIFESTYLE VARIABLES
EVER HAD A DRINK FIRST THING IN THE MORNING TO STEADY YOUR NERVES TO GET RID OF A HANGOVER: NO
EVER FELT BAD OR GUILTY ABOUT YOUR DRINKING: NO
HAVE YOU EVER FELT YOU SHOULD CUT DOWN ON YOUR DRINKING: NO
HAVE PEOPLE ANNOYED YOU BY CRITICIZING YOUR DRINKING: NO

## 2024-03-20 ASSESSMENT — PAIN DESCRIPTION - FREQUENCY: FREQUENCY: CONSTANT/CONTINUOUS

## 2024-03-20 ASSESSMENT — PAIN - FUNCTIONAL ASSESSMENT
PAIN_FUNCTIONAL_ASSESSMENT: 0-10
PAIN_FUNCTIONAL_ASSESSMENT: 0-10

## 2024-03-20 ASSESSMENT — PAIN DESCRIPTION - LOCATION: LOCATION: LEG

## 2024-03-20 ASSESSMENT — PAIN DESCRIPTION - PAIN TYPE: TYPE: ACUTE PAIN

## 2024-03-20 ASSESSMENT — COLUMBIA-SUICIDE SEVERITY RATING SCALE - C-SSRS
6. HAVE YOU EVER DONE ANYTHING, STARTED TO DO ANYTHING, OR PREPARED TO DO ANYTHING TO END YOUR LIFE?: NO
1. IN THE PAST MONTH, HAVE YOU WISHED YOU WERE DEAD OR WISHED YOU COULD GO TO SLEEP AND NOT WAKE UP?: NO
2. HAVE YOU ACTUALLY HAD ANY THOUGHTS OF KILLING YOURSELF?: NO

## 2024-03-20 ASSESSMENT — PAIN SCALES - GENERAL
PAINLEVEL_OUTOF10: 2
PAINLEVEL_OUTOF10: 7

## 2024-03-20 NOTE — ED PROVIDER NOTES
"Chief Complaint   Patient presents with    Arm Pain     Left arm pain for \"couple\" days denies any injury, 7/10 pain constant dull,when pain started it began on left side of neck and had little swelling and radiated to arm, hx apx a week ago had eye sx, per pt has copd and breathing is normal having wheezing when walking        HPI       74 year old right hand dominant female presents to the Emergency Department today complaining of left shoulder pain that she describes as dull in nature, constant, starts in her neck and radiates down her arm, and varies in intensity. Notes that the pain started after she attempted to reach for something in the grocery store, but denies any other specific injury/trauma to the area. Denies any associated fever, chills, headache, neck pain, chest pain, shortness of breath, abdominal pain, nausea, vomiting, diarrhea, constipation, or urinary symptoms.       History provided by:  Patient             Patient History   Past Medical History:   Diagnosis Date    Actinic keratosis 02/28/2017    Arthralgia 01/20/2023    Bipolar disorder, unspecified (CMS/[facility])     Bipolar disease, chronic    Chronic obstructive pulmonary disease (CMS/[facility]) 01/20/2023    COPD (chronic obstructive pulmonary disease) with acute bronchitis (CMS/[facility]) 04/04/2023    Fecal incontinence 01/20/2023    Hemoptysis 04/12/2023    Lump of skin 04/12/2023    Other seborrheic keratosis 02/28/2017    Personal history of other diseases of the digestive system     History of gastroesophageal reflux (GERD)    Personal history of other mental and behavioral disorders     History of anxiety    Scar condition and fibrosis of skin 02/28/2017    Shortness of breath 03/14/2023    Shortness of breath at rest 02/22/2017    Skin changes due to chronic exposure to nonionizing radiation, unspecified 02/28/2017    Wheezing 02/22/2017     Past Surgical History:   Procedure Laterality Date    OTHER SURGICAL HISTORY  12/28/2018    Hernia repair "    OTHER SURGICAL HISTORY  2018    Tonsillectomy    OTHER SURGICAL HISTORY  2018    Hip surgery    OTHER SURGICAL HISTORY  2018    Bone transplantation autograft     Family History   Problem Relation Name Age of Onset    Other (SMALL CELL CARCINOMA) Mother      Emphysema Mother      Emphysema Father      Heart failure Father      Emphysema Brother Mayo     Lung cancer Brother Mayo     Coronary artery disease Brother Mayo     Other (cardiac bypass) Brother Mayo     Other (shoulder replacement) Brother Timoteo     Gout Brother Timoteo     Memory loss Brother Manjit     Gout Brother Manjit     Accidental death Brother Amilcar      Social History     Tobacco Use    Smoking status: Former     Packs/day: 1.00     Years: 50.00     Additional pack years: 0.00     Total pack years: 50.00     Types: Cigarettes     Quit date: 10/1/2022     Years since quittin.4    Smokeless tobacco: Never   Vaping Use    Vaping Use: Former   Substance Use Topics    Alcohol use: Yes     Alcohol/week: 1.0 standard drink of alcohol     Types: 1 Standard drinks or equivalent per week    Drug use: Yes     Comment: CBD gummies           Physical Exam  Constitutional:       Appearance: Normal appearance.   HENT:      Head: Normocephalic.      Right Ear: External ear normal.      Left Ear: External ear normal.      Nose: Nose normal. No septal deviation.      Right Turbinates: Not enlarged.      Left Turbinates: Not enlarged.      Mouth/Throat:      Lips: Pink.      Mouth: Mucous membranes are moist.      Dentition: No dental caries.      Tongue: No lesions.      Pharynx: Oropharynx is clear. Uvula midline.      Tonsils: No tonsillar exudate. 1+ on the right. 1+ on the left.   Eyes:      General: Lids are normal.      Conjunctiva/sclera: Conjunctivae normal.   Cardiovascular:      Rate and Rhythm: Normal rate and regular rhythm.      Pulses:           Radial pulses are 3+ on the right side and 3+ on the left side.      Heart sounds:  Normal heart sounds. No murmur heard.     No friction rub. No gallop.   Pulmonary:      Effort: Pulmonary effort is normal.      Breath sounds: Normal breath sounds. No wheezing, rhonchi or rales.   Abdominal:      General: Abdomen is flat. Bowel sounds are normal.      Palpations: Abdomen is soft.      Tenderness: There is no abdominal tenderness. There is no right CVA tenderness or left CVA tenderness. Negative signs include Wiley's sign and McBurney's sign.   Musculoskeletal:      Cervical back: Full passive range of motion without pain and neck supple.      Comments: No edema, cyanosis, or clubbing noted. No spinous process tenderness, but does have left paraspinal muscle tenderness to the cervical spine region. No obvious deformity, ecchymosis, or edema noted to the left shoulder, but she does have diffuse tenderness present. Limited ROM related to pain. Left radial pulse is strong and regular. Capillary refill was within normal limits. Sensation is intact distally.    Lymphadenopathy:      Cervical: No cervical adenopathy.   Skin:     General: Skin is warm.      Capillary Refill: Capillary refill takes less than 2 seconds.      Findings: No petechiae or rash. Rash is not purpuric.   Neurological:      Mental Status: She is alert and oriented to person, place, and time.      Sensory: Sensation is intact.      Motor: Motor function is intact.      Coordination: Coordination is intact.      Gait: Gait is intact.   Psychiatric:         Attention and Perception: Attention normal.         Mood and Affect: Mood normal.         Speech: Speech normal.         Behavior: Behavior normal. Behavior is cooperative.         Labs Reviewed - No data to display    XR shoulder left 2+ views   Final Result   Glenohumeral and AC degenerative changes. No fracture or subluxation.   Signed by Alvin Bonilla MD               ED Course & MDM   Diagnoses as of 03/20/24 3130   Other sprain of left shoulder joint, initial encounter    Neck sprain, initial encounter           Medical Decision Making  Patient was seen and evaluated by myself. Left shoulder x-ray shows degenerative changes. Given injections of Toradol and Norflex with improvement in her pain. Had increased ROM as well. Instructed to ice and elevate the sore area as much as possible. Given prescriptions for Meloxicam, Lidoderm patches,  and Flexeril. No contraindications to NSAIDs are noted. Follow up with their doctor in 1 week. Return if worse in any way. Discharged in stable condition with computer instructions.    Diagnostic Impression:     1. Acute left cervical and shoulder sprain    2. IM injection    3. Prescription therapy            Your medication list        ASK your doctor about these medications        Instructions Last Dose Given Next Dose Due   albuterol 90 mcg/actuation inhaler      Inhale 1 puff every 4 hours if needed for wheezing or shortness of breath.       albuterol 2.5 mg /3 mL (0.083 %) nebulizer solution      Take 3 mL (2.5 mg) by nebulization 4 times a day as needed for wheezing or shortness of breath.       ALPRAZolam 0.5 mg tablet  Commonly known as: Xanax      Take 1 tablet (0.5 mg) by mouth once daily as needed for anxiety.       ARIPiprazole 2 mg tablet  Commonly known as: Abilify      TAKE 1 TABLET BY MOUTH ONCE DAILY.       azelastine 137 mcg (0.1 %) nasal spray  Commonly known as: Astelin      SPRAY 1 SPRAY INTO EACH NOSTRIL IN THE MORNING AND BEFORE BEDTIME AS DIRECTED       biotin 1 mg capsule           busPIRone 7.5 mg tablet  Commonly known as: Buspar           CALCIUM CITRATE ORAL           ferrous gluconate 256 mg (28 mg iron) tablet           FISH OIL ORAL           fluticasone propion-salmeteroL 500-50 mcg/dose diskus inhaler  Commonly known as: Wixela Inhub      Inhale 1 puff 2 times a day. Rinse mouth with water after use to reduce aftertaste and incidence of candidiasis. Do not swallow.       hydrOXYzine HCL 50 mg tablet  Commonly known  as: Atarax      TAKE 1 TABLET BY MOUTH ONCE DAILY AT BEDTIME.       ibuprofen 200 mg tablet           MAGNESIUM ORAL           MILK THISTLE ORAL           montelukast 10 mg tablet  Commonly known as: Singulair      Take 1 tablet (10 mg) by mouth once daily at bedtime.       Prolia 60 mg/mL syringe  Generic drug: denosumab      Inject 1 mL (60 mg) under the skin 1 time for 1 dose.       rosuvastatin 10 mg tablet  Commonly known as: Crestor      Take 1 tablet (10 mg) by mouth once daily.       sertraline 100 mg tablet  Commonly known as: Zoloft      Take 1 tablet (100 mg) by mouth once daily.       tiotropium 18 mcg inhalation capsule  Commonly known as: Spiriva with HandiHaler      Place 1 capsule (18 mcg) into inhaler and inhale once daily.       traZODone 50 mg tablet  Commonly known as: Desyrel      TAKE 1 TABLET BY MOUTH AS NEEDED AT BEDTIME FOR SLEEP.       turmeric 400 mg capsule           VITAMIN B COMPLEX ORAL           VITAMIN C ORAL           ZINC ORAL                      Procedure  Procedures     CLAY Dean-CARMINA  03/20/24 4614       CLAY Dean-CARMINA  03/20/24 1595

## 2024-03-20 NOTE — ED TRIAGE NOTES
"Left arm pain for \"couple\" days denies any injury, 7/10 pain constant dull,when pain started it began on left side of neck and had little swelling and radiated to arm, hx apx a week ago had eye sx, per pt has copd and breathing is normal having wheezing when walking   "

## 2024-03-21 ENCOUNTER — OFFICE VISIT (OUTPATIENT)
Dept: PRIMARY CARE | Facility: CLINIC | Age: 74
End: 2024-03-21
Payer: MEDICARE

## 2024-03-21 VITALS
BODY MASS INDEX: 31.05 KG/M2 | HEIGHT: 64 IN | DIASTOLIC BLOOD PRESSURE: 90 MMHG | HEART RATE: 76 BPM | TEMPERATURE: 97.6 F | WEIGHT: 181.9 LBS | OXYGEN SATURATION: 95 % | SYSTOLIC BLOOD PRESSURE: 160 MMHG | RESPIRATION RATE: 20 BRPM

## 2024-03-21 DIAGNOSIS — M25.512 ACUTE PAIN OF LEFT SHOULDER: ICD-10-CM

## 2024-03-21 DIAGNOSIS — M19.012 ARTHROPATHY OF LEFT SHOULDER: Primary | ICD-10-CM

## 2024-03-21 DIAGNOSIS — F41.1 GENERALIZED ANXIETY DISORDER: ICD-10-CM

## 2024-03-21 PROCEDURE — 1159F MED LIST DOCD IN RCRD: CPT

## 2024-03-21 PROCEDURE — 1036F TOBACCO NON-USER: CPT

## 2024-03-21 PROCEDURE — 1125F AMNT PAIN NOTED PAIN PRSNT: CPT

## 2024-03-21 PROCEDURE — 99213 OFFICE O/P EST LOW 20 MIN: CPT

## 2024-03-21 PROCEDURE — 1123F ACP DISCUSS/DSCN MKR DOCD: CPT

## 2024-03-21 PROCEDURE — 1160F RVW MEDS BY RX/DR IN RCRD: CPT

## 2024-03-21 RX ORDER — SERTRALINE HYDROCHLORIDE 100 MG/1
100 TABLET, FILM COATED ORAL DAILY
Qty: 90 TABLET | Refills: 3 | Status: SHIPPED | OUTPATIENT
Start: 2024-03-21 | End: 2024-06-06 | Stop reason: SDUPTHER

## 2024-03-21 RX ORDER — PREDNISOLONE ACETATE 10 MG/ML
1 SUSPENSION/ DROPS OPHTHALMIC 4 TIMES DAILY
COMMUNITY
Start: 2024-03-05 | End: 2024-06-06 | Stop reason: ALTCHOICE

## 2024-03-21 RX ORDER — DICLOFENAC SODIUM 1 MG/ML
SOLUTION/ DROPS OPHTHALMIC 4 TIMES DAILY
COMMUNITY
Start: 2024-03-05 | End: 2024-06-06 | Stop reason: ALTCHOICE

## 2024-03-21 RX ORDER — MOXIFLOXACIN 5 MG/ML
1 SOLUTION/ DROPS OPHTHALMIC 3 TIMES DAILY
COMMUNITY
Start: 2024-03-05 | End: 2024-06-06 | Stop reason: ALTCHOICE

## 2024-03-21 SDOH — ECONOMIC STABILITY: FOOD INSECURITY: WITHIN THE PAST 12 MONTHS, THE FOOD YOU BOUGHT JUST DIDN'T LAST AND YOU DIDN'T HAVE MONEY TO GET MORE.: NEVER TRUE

## 2024-03-21 SDOH — ECONOMIC STABILITY: FOOD INSECURITY: WITHIN THE PAST 12 MONTHS, YOU WORRIED THAT YOUR FOOD WOULD RUN OUT BEFORE YOU GOT MONEY TO BUY MORE.: NEVER TRUE

## 2024-03-21 ASSESSMENT — PATIENT HEALTH QUESTIONNAIRE - PHQ9
3. TROUBLE FALLING OR STAYING ASLEEP: NEARLY EVERY DAY
4. FEELING TIRED OR HAVING LITTLE ENERGY: NOT AT ALL
SUM OF ALL RESPONSES TO PHQ QUESTIONS 1-9: 9
3. TROUBLE FALLING OR STAYING ASLEEP OR SLEEPING TOO MUCH: NEARLY EVERY DAY
5. POOR APPETITE OR OVEREATING: NOT AT ALL
8. MOVING OR SPEAKING SO SLOWLY THAT OTHER PEOPLE COULD HAVE NOTICED. OR THE OPPOSITE, BEING SO FIGETY OR RESTLESS THAT YOU HAVE BEEN MOVING AROUND A LOT MORE THAN USUAL: NOT AT ALL
SUM OF ALL RESPONSES TO PHQ QUESTIONS 1-9: 9
7. TROUBLE CONCENTRATING ON THINGS, SUCH AS READING THE NEWSPAPER OR WATCHING TELEVISION: NOT AT ALL
SUM OF ALL RESPONSES TO PHQ9 QUESTIONS 1 & 2: 6
2. FEELING DOWN, DEPRESSED OR HOPELESS: NEARLY EVERY DAY
1. LITTLE INTEREST OR PLEASURE IN DOING THINGS: NEARLY EVERY DAY
9. THOUGHTS THAT YOU WOULD BE BETTER OFF DEAD, OR OF HURTING YOURSELF: NOT AT ALL
10. IF YOU CHECKED OFF ANY PROBLEMS, HOW DIFFICULT HAVE THESE PROBLEMS MADE IT FOR YOU TO DO YOUR WORK, TAKE CARE OF THINGS AT HOME, OR GET ALONG WITH OTHER PEOPLE: SOMEWHAT DIFFICULT
5. POOR APPETITE OR OVEREATING: NOT AT ALL
7. TROUBLE CONCENTRATING ON THINGS, SUCH AS READING THE NEWSPAPER OR WATCHING TELEVISION: NOT AT ALL
8. MOVING OR SPEAKING SO SLOWLY THAT OTHER PEOPLE COULD HAVE NOTICED. OR THE OPPOSITE, BEING SO FIGETY OR RESTLESS THAT YOU HAVE BEEN MOVING AROUND A LOT MORE THAN USUAL: NOT AT ALL
2. FEELING DOWN, DEPRESSED OR HOPELESS: NEARLY EVERY DAY
SUM OF ALL RESPONSES TO PHQ9 QUESTIONS 1 AND 2: 6
10. IF YOU CHECKED OFF ANY PROBLEMS, HOW DIFFICULT HAVE THESE PROBLEMS MADE IT FOR YOU TO DO YOUR WORK, TAKE CARE OF THINGS AT HOME, OR GET ALONG WITH OTHER PEOPLE: SOMEWHAT DIFFICULT
9. THOUGHTS THAT YOU WOULD BE BETTER OFF DEAD, OR OF HURTING YOURSELF: NOT AT ALL
1. LITTLE INTEREST OR PLEASURE IN DOING THINGS: NEARLY EVERY DAY
6. FEELING BAD ABOUT YOURSELF - OR THAT YOU ARE A FAILURE OR HAVE LET YOURSELF OR YOUR FAMILY DOWN: NOT AT ALL
4. FEELING TIRED OR HAVING LITTLE ENERGY: NOT AT ALL
6. FEELING BAD ABOUT YOURSELF - OR THAT YOU ARE A FAILURE OR HAVE LET YOURSELF OR YOUR FAMILY DOWN: NOT AT ALL

## 2024-03-21 ASSESSMENT — ENCOUNTER SYMPTOMS
DEPRESSION: 1
ENDOCRINE NEGATIVE: 1
NEUROLOGICAL NEGATIVE: 1
OCCASIONAL FEELINGS OF UNSTEADINESS: 0
HEMATOLOGIC/LYMPHATIC NEGATIVE: 1
CONSTITUTIONAL NEGATIVE: 1
CARDIOVASCULAR NEGATIVE: 1
LOSS OF SENSATION IN FEET: 0
PSYCHIATRIC NEGATIVE: 1
GASTROINTESTINAL NEGATIVE: 1
RESPIRATORY NEGATIVE: 1
MUSCULOSKELETAL NEGATIVE: 1
EYES NEGATIVE: 1

## 2024-03-21 ASSESSMENT — PAIN SCALES - GENERAL: PAINLEVEL: 4

## 2024-03-21 NOTE — PATIENT INSTRUCTIONS
Thank you for coming to see me today.  If you have any questions or concerns following our visit, please contact the office.  Phone: [phone]    Follow up with me in April or sooner as needed    1)  I am referring you to orthopedic surgery with Dr. Escalera for left shoulder pain - please call [phone] to schedule an appointment or schedule at  on your way out

## 2024-03-21 NOTE — ASSESSMENT & PLAN NOTE
Acute left shoulder pain, prompted to present to ED for evaluation  Was given toradol injection, lidocaine patches, meloxicam and cyclobenzaprine for management  Rating left shoulder pain as 4/10 dull with intermittent sharp pain, worse with motion.  strength and push pulls intact.     Refer to ortho surgery for evaluation

## 2024-04-03 ENCOUNTER — APPOINTMENT (OUTPATIENT)
Dept: ORTHOPEDIC SURGERY | Facility: CLINIC | Age: 74
End: 2024-04-03
Payer: MEDICARE

## 2024-04-09 ENCOUNTER — OFFICE VISIT (OUTPATIENT)
Dept: UROLOGY | Facility: CLINIC | Age: 74
End: 2024-04-09
Payer: MEDICARE

## 2024-04-09 DIAGNOSIS — N39.3 SUI (STRESS URINARY INCONTINENCE, FEMALE): Primary | ICD-10-CM

## 2024-04-09 DIAGNOSIS — R15.9 INCONTINENCE OF FECES, UNSPECIFIED FECAL INCONTINENCE TYPE: ICD-10-CM

## 2024-04-09 PROCEDURE — 1159F MED LIST DOCD IN RCRD: CPT | Performed by: STUDENT IN AN ORGANIZED HEALTH CARE EDUCATION/TRAINING PROGRAM

## 2024-04-09 PROCEDURE — 1123F ACP DISCUSS/DSCN MKR DOCD: CPT | Performed by: STUDENT IN AN ORGANIZED HEALTH CARE EDUCATION/TRAINING PROGRAM

## 2024-04-09 PROCEDURE — 99214 OFFICE O/P EST MOD 30 MIN: CPT | Performed by: STUDENT IN AN ORGANIZED HEALTH CARE EDUCATION/TRAINING PROGRAM

## 2024-04-09 PROCEDURE — 1160F RVW MEDS BY RX/DR IN RCRD: CPT | Performed by: STUDENT IN AN ORGANIZED HEALTH CARE EDUCATION/TRAINING PROGRAM

## 2024-04-09 RX ORDER — TAMSULOSIN HYDROCHLORIDE 0.4 MG/1
CAPSULE ORAL
Qty: 10 CAPSULE | Refills: 0 | Status: SHIPPED | OUTPATIENT
Start: 2024-04-09 | End: 2024-06-06 | Stop reason: ALTCHOICE

## 2024-04-09 NOTE — PROGRESS NOTES
HISTORY OF PRESENT ILLNESS:  Viki Kumar is a 74 y.o. female who presents today for an   8 week follow up visit.  She did not take the Metamucil or Myrbetriq, she states her bowel function is currently normal.           Past Medical History  She has a past medical history of Actinic keratosis (02/28/2017), Arthralgia (01/20/2023), Bipolar disorder, unspecified (CMS/[facility]), Chronic obstructive pulmonary disease (CMS/[facility]) (01/20/2023), COPD (chronic obstructive pulmonary disease) with acute bronchitis (CMS/[facility]) (04/04/2023), Fecal incontinence (01/20/2023), Hemoptysis (04/12/2023), Lump of skin (04/12/2023), Other seborrheic keratosis (02/28/2017), Personal history of other diseases of the digestive system, Personal history of other mental and behavioral disorders, Scar condition and fibrosis of skin (02/28/2017), Shortness of breath (03/14/2023), Shortness of breath at rest (02/22/2017), Skin changes due to chronic exposure to nonionizing radiation, unspecified (02/28/2017), and Wheezing (02/22/2017).    Surgical History  She has a past surgical history that includes Other surgical history (12/28/2018); Other surgical history (12/28/2018); Other surgical history (12/28/2018); and Other surgical history (12/31/2018).     Social History  She reports that she quit smoking about 18 months ago. Her smoking use included cigarettes. She has a 50.00 pack-year smoking history. She has been exposed to tobacco smoke. She has never used smokeless tobacco. She reports that she does not currently use alcohol. She reports that she does not use drugs.    Family History  Family History   Problem Relation Name Age of Onset    Other (SMALL CELL CARCINOMA) Mother      Emphysema Mother      Emphysema Father      Heart failure Father      Emphysema Brother Mayo     Lung cancer Brother Mayo     Coronary artery disease Brother Mayo     Other (cardiac bypass) Brother Mayo     Other (shoulder replacement) Brother Timoteo     Gout Brother  "Timoteo     Memory loss Brother Manjit     Gout Brother Manjit     Accidental death Brother Amilcar         Allergies  Patient has no known allergies.      A comprehensive 10+ review of systems was negative except for: see hpi                          PHYSICAL EXAMINATION:  BP Readings from Last 3 Encounters:   03/21/24 160/90   03/20/24 135/82   02/13/24 175/83      Wt Readings from Last 3 Encounters:   03/21/24 82.5 kg (181 lb 14.4 oz)   03/20/24 83.9 kg (185 lb)   12/13/23 81.6 kg (180 lb)      BMI: Estimated body mass index is 31.22 kg/m² as calculated from the following:    Height as of 3/21/24: 1.626 m (5' 4\").    Weight as of 3/21/24: 82.5 kg (181 lb 14.4 oz).  BSA: Estimated body surface area is 1.93 meters squared as calculated from the following:    Height as of 3/21/24: 1.626 m (5' 4\").    Weight as of 3/21/24: 82.5 kg (181 lb 14.4 oz).  HEENT: Normocephalic, atraumatic, PER EOMI, nonicteric, trachea normal, thyroid normal, oropharynx normal.  CARDIAC: regular rate & rhythm, S1 & S2 normal.  No heaves, thrills, gallops or murmurs.  LUNGS: Clear to auscultation, no spinal or CV tenderness.  EXTREMITIES: No evidence of cyanosis, clubbing or edema.               Assessment:  Viki Kumar is a 74 y.o. who presents with urgency incontinence, fecal incontinence mixed incontinence with positive CST and fecal incontinence        SHIELA   She did not take the Myrbetriq, we are planning on a sling in a few weeks, She had a positive CST    Will see how her urgent care response to the sling     Flomax prescribed      Fecal incontinence:  Currently this is well-controlled off meds        All questions and concerns were answered and addressed.  The patient expressed understanding and agrees with the plan.       Manjit Patton MD    Scribe Attestation  By signing my name below, I Lilyranjith Benton, Scribe   attest that this documentation has been prepared under the direction and in the presence of Manjit Patton MD.  "

## 2024-04-26 ENCOUNTER — APPOINTMENT (OUTPATIENT)
Dept: PRIMARY CARE | Facility: CLINIC | Age: 74
End: 2024-04-26
Payer: MEDICARE

## 2024-05-14 ENCOUNTER — ANESTHESIA EVENT (OUTPATIENT)
Dept: OPERATING ROOM | Facility: HOSPITAL | Age: 74
End: 2024-05-14
Payer: MEDICARE

## 2024-05-14 RX ORDER — MORPHINE SULFATE 4 MG/ML
4 INJECTION INTRAVENOUS EVERY 5 MIN PRN
Status: CANCELLED | OUTPATIENT
Start: 2024-05-14

## 2024-05-14 RX ORDER — MORPHINE SULFATE 2 MG/ML
2 INJECTION, SOLUTION INTRAMUSCULAR; INTRAVENOUS EVERY 5 MIN PRN
Status: CANCELLED | OUTPATIENT
Start: 2024-05-14

## 2024-05-14 RX ORDER — ONDANSETRON HYDROCHLORIDE 2 MG/ML
4 INJECTION, SOLUTION INTRAVENOUS ONCE AS NEEDED
Status: CANCELLED | OUTPATIENT
Start: 2024-05-14

## 2024-05-17 ENCOUNTER — PHARMACY VISIT (OUTPATIENT)
Dept: PHARMACY | Facility: CLINIC | Age: 74
End: 2024-05-17
Payer: COMMERCIAL

## 2024-05-17 ENCOUNTER — LAB (OUTPATIENT)
Dept: LAB | Facility: LAB | Age: 74
End: 2024-05-17
Payer: MEDICARE

## 2024-05-17 ENCOUNTER — HOSPITAL ENCOUNTER (OUTPATIENT)
Facility: HOSPITAL | Age: 74
Setting detail: OUTPATIENT SURGERY
Discharge: HOME | End: 2024-05-17
Attending: STUDENT IN AN ORGANIZED HEALTH CARE EDUCATION/TRAINING PROGRAM | Admitting: STUDENT IN AN ORGANIZED HEALTH CARE EDUCATION/TRAINING PROGRAM
Payer: MEDICARE

## 2024-05-17 ENCOUNTER — ANESTHESIA (OUTPATIENT)
Dept: OPERATING ROOM | Facility: HOSPITAL | Age: 74
End: 2024-05-17
Payer: MEDICARE

## 2024-05-17 VITALS
DIASTOLIC BLOOD PRESSURE: 72 MMHG | OXYGEN SATURATION: 96 % | SYSTOLIC BLOOD PRESSURE: 150 MMHG | TEMPERATURE: 98.1 F | RESPIRATION RATE: 25 BRPM | HEART RATE: 90 BPM

## 2024-05-17 DIAGNOSIS — E78.1 HYPERTRIGLYCERIDEMIA WITHOUT HYPERCHOLESTEROLEMIA: ICD-10-CM

## 2024-05-17 DIAGNOSIS — R73.03 PREDIABETES: ICD-10-CM

## 2024-05-17 DIAGNOSIS — R73.03 PREDIABETES: Primary | ICD-10-CM

## 2024-05-17 DIAGNOSIS — N39.3 SUI (STRESS URINARY INCONTINENCE, FEMALE): ICD-10-CM

## 2024-05-17 DIAGNOSIS — R32 URINARY INCONTINENCE, UNSPECIFIED TYPE: Primary | ICD-10-CM

## 2024-05-17 LAB
ANION GAP SERPL CALC-SCNC: 14 MMOL/L (ref 10–20)
ANION GAP SERPL CALC-SCNC: 15 MMOL/L (ref 10–20)
BUN SERPL-MCNC: 15 MG/DL (ref 6–23)
BUN SERPL-MCNC: 15 MG/DL (ref 6–23)
CALCIUM SERPL-MCNC: 9.5 MG/DL (ref 8.6–10.3)
CALCIUM SERPL-MCNC: 9.5 MG/DL (ref 8.6–10.3)
CHLORIDE SERPL-SCNC: 107 MMOL/L (ref 98–107)
CHLORIDE SERPL-SCNC: 108 MMOL/L (ref 98–107)
CHOLEST SERPL-MCNC: 132 MG/DL (ref 0–199)
CHOLESTEROL/HDL RATIO: 3.1
CO2 SERPL-SCNC: 22 MMOL/L (ref 21–32)
CO2 SERPL-SCNC: 23 MMOL/L (ref 21–32)
CREAT SERPL-MCNC: 0.72 MG/DL (ref 0.5–1.05)
CREAT SERPL-MCNC: 0.72 MG/DL (ref 0.5–1.05)
EGFRCR SERPLBLD CKD-EPI 2021: 89 ML/MIN/1.73M*2
EGFRCR SERPLBLD CKD-EPI 2021: 89 ML/MIN/1.73M*2
ERYTHROCYTE [DISTWIDTH] IN BLOOD BY AUTOMATED COUNT: 13.6 % (ref 11.5–14.5)
GLUCOSE SERPL-MCNC: 107 MG/DL (ref 74–99)
GLUCOSE SERPL-MCNC: 111 MG/DL (ref 74–99)
HCT VFR BLD AUTO: 42.2 % (ref 36–46)
HDLC SERPL-MCNC: 42.8 MG/DL
HGB BLD-MCNC: 14.1 G/DL (ref 12–16)
LDLC SERPL CALC-MCNC: 45 MG/DL
MCH RBC QN AUTO: 27.7 PG (ref 26–34)
MCHC RBC AUTO-ENTMCNC: 33.4 G/DL (ref 32–36)
MCV RBC AUTO: 83 FL (ref 80–100)
NON HDL CHOLESTEROL: 89 MG/DL (ref 0–149)
NRBC BLD-RTO: 0 /100 WBCS (ref 0–0)
PLATELET # BLD AUTO: 216 X10*3/UL (ref 150–450)
POTASSIUM SERPL-SCNC: 4.1 MMOL/L (ref 3.5–5.3)
POTASSIUM SERPL-SCNC: 4.2 MMOL/L (ref 3.5–5.3)
RBC # BLD AUTO: 5.09 X10*6/UL (ref 4–5.2)
SODIUM SERPL-SCNC: 140 MMOL/L (ref 136–145)
SODIUM SERPL-SCNC: 141 MMOL/L (ref 136–145)
TRIGL SERPL-MCNC: 219 MG/DL (ref 0–149)
VLDL: 44 MG/DL (ref 0–40)
WBC # BLD AUTO: 8 X10*3/UL (ref 4.4–11.3)

## 2024-05-17 PROCEDURE — 57288 REPAIR BLADDER DEFECT: CPT | Performed by: STUDENT IN AN ORGANIZED HEALTH CARE EDUCATION/TRAINING PROGRAM

## 2024-05-17 PROCEDURE — 36415 COLL VENOUS BLD VENIPUNCTURE: CPT | Performed by: ANESTHESIOLOGY

## 2024-05-17 PROCEDURE — 7100000009 HC PHASE TWO TIME - INITIAL BASE CHARGE: Performed by: STUDENT IN AN ORGANIZED HEALTH CARE EDUCATION/TRAINING PROGRAM

## 2024-05-17 PROCEDURE — 2500000001 HC RX 250 WO HCPCS SELF ADMINISTERED DRUGS (ALT 637 FOR MEDICARE OP): Performed by: STUDENT IN AN ORGANIZED HEALTH CARE EDUCATION/TRAINING PROGRAM

## 2024-05-17 PROCEDURE — 83036 HEMOGLOBIN GLYCOSYLATED A1C: CPT

## 2024-05-17 PROCEDURE — 7100000002 HC RECOVERY ROOM TIME - EACH INCREMENTAL 1 MINUTE: Performed by: STUDENT IN AN ORGANIZED HEALTH CARE EDUCATION/TRAINING PROGRAM

## 2024-05-17 PROCEDURE — 3600000003 HC OR TIME - INITIAL BASE CHARGE - PROCEDURE LEVEL THREE: Performed by: STUDENT IN AN ORGANIZED HEALTH CARE EDUCATION/TRAINING PROGRAM

## 2024-05-17 PROCEDURE — 2500000005 HC RX 250 GENERAL PHARMACY W/O HCPCS: Performed by: NURSE ANESTHETIST, CERTIFIED REGISTERED

## 2024-05-17 PROCEDURE — 2500000004 HC RX 250 GENERAL PHARMACY W/ HCPCS (ALT 636 FOR OP/ED): Mod: JZ | Performed by: STUDENT IN AN ORGANIZED HEALTH CARE EDUCATION/TRAINING PROGRAM

## 2024-05-17 PROCEDURE — 3700000002 HC GENERAL ANESTHESIA TIME - EACH INCREMENTAL 1 MINUTE: Performed by: STUDENT IN AN ORGANIZED HEALTH CARE EDUCATION/TRAINING PROGRAM

## 2024-05-17 PROCEDURE — RXMED WILLOW AMBULATORY MEDICATION CHARGE

## 2024-05-17 PROCEDURE — 2500000004 HC RX 250 GENERAL PHARMACY W/ HCPCS (ALT 636 FOR OP/ED): Performed by: NURSE ANESTHETIST, CERTIFIED REGISTERED

## 2024-05-17 PROCEDURE — 2500000004 HC RX 250 GENERAL PHARMACY W/ HCPCS (ALT 636 FOR OP/ED): Performed by: ANESTHESIOLOGY

## 2024-05-17 PROCEDURE — 3700000001 HC GENERAL ANESTHESIA TIME - INITIAL BASE CHARGE: Performed by: STUDENT IN AN ORGANIZED HEALTH CARE EDUCATION/TRAINING PROGRAM

## 2024-05-17 PROCEDURE — 2500000001 HC RX 250 WO HCPCS SELF ADMINISTERED DRUGS (ALT 637 FOR MEDICARE OP): Performed by: ANESTHESIOLOGY

## 2024-05-17 PROCEDURE — 7100000001 HC RECOVERY ROOM TIME - INITIAL BASE CHARGE: Performed by: STUDENT IN AN ORGANIZED HEALTH CARE EDUCATION/TRAINING PROGRAM

## 2024-05-17 PROCEDURE — 96372 THER/PROPH/DIAG INJ SC/IM: CPT | Performed by: NURSE ANESTHETIST, CERTIFIED REGISTERED

## 2024-05-17 PROCEDURE — 3600000008 HC OR TIME - EACH INCREMENTAL 1 MINUTE - PROCEDURE LEVEL THREE: Performed by: STUDENT IN AN ORGANIZED HEALTH CARE EDUCATION/TRAINING PROGRAM

## 2024-05-17 PROCEDURE — 80048 BASIC METABOLIC PNL TOTAL CA: CPT

## 2024-05-17 PROCEDURE — 2720000007 HC OR 272 NO HCPCS: Performed by: STUDENT IN AN ORGANIZED HEALTH CARE EDUCATION/TRAINING PROGRAM

## 2024-05-17 PROCEDURE — C1771 REP DEV, URINARY, W/SLING: HCPCS | Performed by: STUDENT IN AN ORGANIZED HEALTH CARE EDUCATION/TRAINING PROGRAM

## 2024-05-17 PROCEDURE — 2500000005 HC RX 250 GENERAL PHARMACY W/O HCPCS: Performed by: STUDENT IN AN ORGANIZED HEALTH CARE EDUCATION/TRAINING PROGRAM

## 2024-05-17 PROCEDURE — 2780000003 HC OR 278 NO HCPCS: Performed by: STUDENT IN AN ORGANIZED HEALTH CARE EDUCATION/TRAINING PROGRAM

## 2024-05-17 PROCEDURE — 80048 BASIC METABOLIC PNL TOTAL CA: CPT | Performed by: ANESTHESIOLOGY

## 2024-05-17 PROCEDURE — 85027 COMPLETE CBC AUTOMATED: CPT | Performed by: ANESTHESIOLOGY

## 2024-05-17 PROCEDURE — 7100000010 HC PHASE TWO TIME - EACH INCREMENTAL 1 MINUTE: Performed by: STUDENT IN AN ORGANIZED HEALTH CARE EDUCATION/TRAINING PROGRAM

## 2024-05-17 PROCEDURE — 80061 LIPID PANEL: CPT

## 2024-05-17 RX ORDER — KETOROLAC TROMETHAMINE 30 MG/ML
INJECTION, SOLUTION INTRAMUSCULAR; INTRAVENOUS AS NEEDED
Status: DISCONTINUED | OUTPATIENT
Start: 2024-05-17 | End: 2024-05-17

## 2024-05-17 RX ORDER — LIDOCAINE HCL/PF 100 MG/5ML
SYRINGE (ML) INTRAVENOUS AS NEEDED
Status: DISCONTINUED | OUTPATIENT
Start: 2024-05-17 | End: 2024-05-17

## 2024-05-17 RX ORDER — POLYETHYLENE GLYCOL 3350 17 G/17G
17 POWDER, FOR SOLUTION ORAL DAILY
Qty: 510 G | Refills: 0 | Status: SHIPPED | OUTPATIENT
Start: 2024-05-17 | End: 2024-06-16

## 2024-05-17 RX ORDER — ONDANSETRON HYDROCHLORIDE 2 MG/ML
4 INJECTION, SOLUTION INTRAVENOUS ONCE
Status: COMPLETED | OUTPATIENT
Start: 2024-05-17 | End: 2024-05-17

## 2024-05-17 RX ORDER — CEFAZOLIN SODIUM 2 G/100ML
2 INJECTION, SOLUTION INTRAVENOUS ONCE
Status: COMPLETED | OUTPATIENT
Start: 2024-05-17 | End: 2024-05-17

## 2024-05-17 RX ORDER — SODIUM CHLORIDE 9 MG/ML
50 INJECTION, SOLUTION INTRAVENOUS CONTINUOUS
Status: DISCONTINUED | OUTPATIENT
Start: 2024-05-17 | End: 2024-05-17 | Stop reason: HOSPADM

## 2024-05-17 RX ORDER — SODIUM CITRATE AND CITRIC ACID MONOHYDRATE 334; 500 MG/5ML; MG/5ML
30 SOLUTION ORAL ONCE
Status: COMPLETED | OUTPATIENT
Start: 2024-05-17 | End: 2024-05-17

## 2024-05-17 RX ORDER — ONDANSETRON HYDROCHLORIDE 2 MG/ML
INJECTION, SOLUTION INTRAVENOUS AS NEEDED
Status: DISCONTINUED | OUTPATIENT
Start: 2024-05-17 | End: 2024-05-17

## 2024-05-17 RX ORDER — METOCLOPRAMIDE HYDROCHLORIDE 5 MG/ML
10 INJECTION INTRAMUSCULAR; INTRAVENOUS ONCE
Status: COMPLETED | OUTPATIENT
Start: 2024-05-17 | End: 2024-05-17

## 2024-05-17 RX ORDER — ACETAMINOPHEN 500 MG
1000 TABLET ORAL EVERY 6 HOURS PRN
Qty: 30 TABLET | Refills: 0 | Status: SHIPPED | OUTPATIENT
Start: 2024-05-17

## 2024-05-17 RX ORDER — FAMOTIDINE 10 MG/ML
20 INJECTION INTRAVENOUS ONCE
Status: COMPLETED | OUTPATIENT
Start: 2024-05-17 | End: 2024-05-17

## 2024-05-17 RX ORDER — PROPOFOL 10 MG/ML
INJECTION, EMULSION INTRAVENOUS AS NEEDED
Status: DISCONTINUED | OUTPATIENT
Start: 2024-05-17 | End: 2024-05-17

## 2024-05-17 RX ORDER — TRAMADOL HYDROCHLORIDE 50 MG/1
50 TABLET ORAL EVERY 8 HOURS PRN
Qty: 10 TABLET | Refills: 0 | Status: SHIPPED | OUTPATIENT
Start: 2024-05-17 | End: 2024-06-06 | Stop reason: ALTCHOICE

## 2024-05-17 RX ORDER — DOCUSATE SODIUM 100 MG/1
100 CAPSULE, LIQUID FILLED ORAL 2 TIMES DAILY
Qty: 60 CAPSULE | Refills: 0 | Status: SHIPPED | OUTPATIENT
Start: 2024-05-17 | End: 2024-06-06 | Stop reason: ALTCHOICE

## 2024-05-17 RX ORDER — LIDOCAINE HYDROCHLORIDE AND EPINEPHRINE 10; 10 MG/ML; UG/ML
INJECTION, SOLUTION INFILTRATION; PERINEURAL AS NEEDED
Status: DISCONTINUED | OUTPATIENT
Start: 2024-05-17 | End: 2024-05-17 | Stop reason: HOSPADM

## 2024-05-17 RX ORDER — CELECOXIB 200 MG/1
400 CAPSULE ORAL ONCE
Status: COMPLETED | OUTPATIENT
Start: 2024-05-17 | End: 2024-05-17

## 2024-05-17 RX ORDER — FENTANYL CITRATE 50 UG/ML
INJECTION, SOLUTION INTRAMUSCULAR; INTRAVENOUS AS NEEDED
Status: DISCONTINUED | OUTPATIENT
Start: 2024-05-17 | End: 2024-05-17

## 2024-05-17 RX ORDER — ALBUTEROL SULFATE 0.83 MG/ML
2.5 SOLUTION RESPIRATORY (INHALATION) ONCE
Status: DISCONTINUED | OUTPATIENT
Start: 2024-05-17 | End: 2024-05-17 | Stop reason: HOSPADM

## 2024-05-17 RX ORDER — ACETAMINOPHEN 325 MG/1
975 TABLET ORAL ONCE
Status: COMPLETED | OUTPATIENT
Start: 2024-05-17 | End: 2024-05-17

## 2024-05-17 RX ADMIN — CELECOXIB 400 MG: 200 CAPSULE ORAL at 12:39

## 2024-05-17 RX ADMIN — ONDANSETRON 4 MG: 2 INJECTION INTRAMUSCULAR; INTRAVENOUS at 14:47

## 2024-05-17 RX ADMIN — LIDOCAINE HYDROCHLORIDE 100 MG: 20 INJECTION, SOLUTION INTRAVENOUS at 14:25

## 2024-05-17 RX ADMIN — SODIUM CHLORIDE 50 ML/HR: 9 INJECTION, SOLUTION INTRAVENOUS at 12:40

## 2024-05-17 RX ADMIN — ACETAMINOPHEN 975 MG: 325 TABLET ORAL at 12:39

## 2024-05-17 RX ADMIN — SODIUM CITRATE AND CITRIC ACID MONOHYDRATE 30 ML: 500; 334 SOLUTION ORAL at 12:39

## 2024-05-17 RX ADMIN — FENTANYL CITRATE 100 MCG: 50 INJECTION INTRAMUSCULAR; INTRAVENOUS at 14:25

## 2024-05-17 RX ADMIN — METOCLOPRAMIDE 10 MG: 5 INJECTION, SOLUTION INTRAMUSCULAR; INTRAVENOUS at 12:39

## 2024-05-17 RX ADMIN — PROPOFOL 200 MG: 10 INJECTION, EMULSION INTRAVENOUS at 14:25

## 2024-05-17 RX ADMIN — CEFAZOLIN SODIUM 2 G: 2 INJECTION, SOLUTION INTRAVENOUS at 14:21

## 2024-05-17 RX ADMIN — FAMOTIDINE 20 MG: 10 INJECTION, SOLUTION INTRAVENOUS at 12:39

## 2024-05-17 RX ADMIN — DEXAMETHASONE SODIUM PHOSPHATE 8 MG: 4 INJECTION, SOLUTION INTRAMUSCULAR; INTRAVENOUS at 12:39

## 2024-05-17 RX ADMIN — ONDANSETRON 4 MG: 2 INJECTION INTRAMUSCULAR; INTRAVENOUS at 12:40

## 2024-05-17 RX ADMIN — KETOROLAC TROMETHAMINE 15 MG: 30 INJECTION, SOLUTION INTRAMUSCULAR at 14:49

## 2024-05-17 SDOH — HEALTH STABILITY: MENTAL HEALTH: CURRENT SMOKER: 0

## 2024-05-17 ASSESSMENT — PAIN SCALES - GENERAL
PAIN_LEVEL: 0
PAINLEVEL_OUTOF10: 0 - NO PAIN

## 2024-05-17 ASSESSMENT — COLUMBIA-SUICIDE SEVERITY RATING SCALE - C-SSRS
6. HAVE YOU EVER DONE ANYTHING, STARTED TO DO ANYTHING, OR PREPARED TO DO ANYTHING TO END YOUR LIFE?: NO
2. HAVE YOU ACTUALLY HAD ANY THOUGHTS OF KILLING YOURSELF?: NO
1. IN THE PAST MONTH, HAVE YOU WISHED YOU WERE DEAD OR WISHED YOU COULD GO TO SLEEP AND NOT WAKE UP?: NO

## 2024-05-17 ASSESSMENT — PAIN - FUNCTIONAL ASSESSMENT
PAIN_FUNCTIONAL_ASSESSMENT: 0-10

## 2024-05-17 NOTE — ANESTHESIA POSTPROCEDURE EVALUATION
Patient: Viki Kumar    Procedure Summary       Date: 05/17/24 Room / Location: [facility] 01 / [facility]    Anesthesia Start: 1421 Anesthesia Stop: 1502    Procedure: Desara sling placement Diagnosis:       SIMON (stress urinary incontinence, female)      (SIMON (stress urinary incontinence, female) [N39.3])    Surgeons: Manjit Patton MD Responsible Provider: PRO Mcbride    Anesthesia Type: general ASA Status: 3            Anesthesia Type: general    Vitals Value Taken Time   /78 05/17/24 1530   Temp 37.3 °C (99.2 °F) 05/17/24 1459   Pulse 91 05/17/24 1520   Resp 15 05/17/24 1520   SpO2 95 % 05/17/24 1530       Anesthesia Post Evaluation    Patient location during evaluation: PACU  Patient participation: complete - patient participated  Level of consciousness: awake and alert  Pain score: 0  Pain management: adequate  Airway patency: patent  Cardiovascular status: hemodynamically stable  Respiratory status: room air  Hydration status: acceptable  Postoperative Nausea and Vomiting: none    There were no known notable events for this encounter.

## 2024-05-17 NOTE — ANESTHESIA PROCEDURE NOTES
Airway  Date/Time: 5/17/2024 2:26 PM  Urgency: elective    Airway not difficult    Staffing  Performed: CRNA   Authorized by: PRO Mcbride    Performed by: PRO Mcbride  Patient location during procedure: OR    Indications and Patient Condition  Indications for airway management: anesthesia  Spontaneous Ventilation: absent  Sedation level: deep  Preoxygenated: yes  Patient position: sniffing  Mask difficulty assessment: 1 - vent by mask  Planned trial extubation    Final Airway Details  Final airway type: supraglottic airway      Successful airway: Supraglottic airway: igel 4.  Size 4     Number of attempts at approach: 1  Ventilation between attempts: none  Number of other approaches attempted: 0    Additional Comments  Atraumatic insertion of igel 4. Lips teeth intact

## 2024-05-17 NOTE — ANESTHESIA PREPROCEDURE EVALUATION
Patient: Viki Kumar    Procedure Information       Anesthesia Start Date/Time: 05/17/24 1421    Procedure: Desara sling placement - ~30 min for procedure    Location: [facility] 01 / [facility]    Surgeons: Manjit Patton MD            Relevant Problems   Anesthesia (within normal limits)      Cardiac   (+) Hyperlipidemia      Pulmonary   (+) Chronic obstructive pulmonary disease (Multi)      Neuro   (+) Depression   (+) Generalized anxiety disorder      Musculoskeletal   (+) Degeneration of intervertebral disc of lumbar region   (+) Lumbar spondylosis       Clinical information reviewed:   Tobacco  Allergies  Meds  Problems  Med Hx  Surg Hx   Fam Hx  Soc   Hx        NPO Detail:  NPO/Void Status  Date of Last Liquid: 05/16/24  Time of Last Liquid: 1900  Date of Last Solid: 05/16/24  Time of Last Solid: 1900         Physical Exam    Airway  Mallampati: III  TM distance: >3 FB     Cardiovascular   Rhythm: regular  Rate: normal     Dental    Pulmonary - normal exam     Abdominal            Anesthesia Plan    History of general anesthesia?: yes  History of complications of general anesthesia?: no    ASA 3     general     The patient is not a current smoker.  Patient was not previously instructed to abstain from smoking on day of procedure.  Patient did not smoke on day of procedure.    intravenous induction   Anesthetic plan and risks discussed with patient.  Use of blood products discussed with patient who consented to blood products.    Plan discussed with CRNA.

## 2024-05-17 NOTE — H&P
HISTORY OF PRESENT ILLNESS:  Pt with tanner, here for sling         Past Medical History  She has a past medical history of Actinic keratosis (02/28/2017), Arthralgia (01/20/2023), Bipolar disorder, unspecified (Multi), Chronic obstructive pulmonary disease (Multi) (01/20/2023), COPD (chronic obstructive pulmonary disease) with acute bronchitis (Multi) (04/04/2023), Fecal incontinence (01/20/2023), Hemoptysis (04/12/2023), Lump of skin (04/12/2023), Other seborrheic keratosis (02/28/2017), Personal history of other diseases of the digestive system, Personal history of other mental and behavioral disorders, Scar condition and fibrosis of skin (02/28/2017), Shortness of breath (03/14/2023), Shortness of breath at rest (02/22/2017), Skin changes due to chronic exposure to nonionizing radiation, unspecified (02/28/2017), and Wheezing (02/22/2017).    Surgical History  She has a past surgical history that includes Other surgical history (12/28/2018); Other surgical history (12/28/2018); Other surgical history (12/28/2018); and Other surgical history (12/31/2018).     Social History  She reports that she quit smoking about 19 months ago. Her smoking use included cigarettes. She started smoking about 51 years ago. She has a 50 pack-year smoking history. She has been exposed to tobacco smoke. She has never used smokeless tobacco. She reports that she does not currently use alcohol. She reports that she does not use drugs.    Family History  Family History   Problem Relation Name Age of Onset    Other (SMALL CELL CARCINOMA) Mother      Emphysema Mother      Emphysema Father      Heart failure Father      Emphysema Brother Mayo     Lung cancer Brother Mayo     Coronary artery disease Brother Mayo     Other (cardiac bypass) Brother Mayo     Other (shoulder replacement) Brother Timoteo     Gout Brother Timoteo     Memory loss Brother Manjit     Gout Brother Manjit     Accidental death Brother Amilcar         Allergies  Patient  "has no known allergies.      A comprehensive 10+ review of systems was negative except for: see hpi                          PHYSICAL EXAMINATION:  BP Readings from Last 3 Encounters:   03/21/24 160/90   03/20/24 135/82   02/13/24 175/83      Wt Readings from Last 3 Encounters:   03/21/24 82.5 kg (181 lb 14.4 oz)   03/20/24 83.9 kg (185 lb)   12/13/23 81.6 kg (180 lb)      BMI: Estimated body mass index is 31.22 kg/m² as calculated from the following:    Height as of 3/21/24: 1.626 m (5' 4\").    Weight as of 3/21/24: 82.5 kg (181 lb 14.4 oz).  BSA: Estimated body surface area is 1.93 meters squared as calculated from the following:    Height as of 3/21/24: 1.626 m (5' 4\").    Weight as of 3/21/24: 82.5 kg (181 lb 14.4 oz).  HEENT: Normocephalic, atraumatic, PER EOMI, nonicteric, trachea normal, thyroid normal, oropharynx normal.  CARDIAC: regular rate & rhythm, S1 & S2 normal.  No heaves, thrills, gallops or murmurs.  LUNGS: Clear to auscultation, no spinal or CV tenderness.  EXTREMITIES: No evidence of cyanosis, clubbing or edema.               Assessment:  Plan as above    Manjit Patton MD        "

## 2024-05-17 NOTE — OP NOTE
Desara sling placement Operative Note     Date: 2024  OR Location: [facility]    Name: Viki Kumar, : 1950, Age: 74 y.o., MRN: [id number], Sex: female    Diagnosis  Pre-op Diagnosis     * SIMON (stress urinary incontinence, female) [N39.3] Post-op Diagnosis     * SIMON (stress urinary incontinence, female) [N39.3]     Procedures  Desara sling placement  41312 - [place] SLING OPERATION STRESS INCONTINENCE      Surgeons      * Manjit Patton - Primary    Resident/Fellow/Other Assistant:  Surgeons and Role:  * No surgeons found with a matching role *    Procedure Summary  Anesthesia: General  ASA: III  Anesthesia Staff: CRNA: CLAY Mcbride-CRNA  Estimated Blood Loss: 10mL  Intra-op Medications: Administrations occurring from 1500 to 1600 on 24:  * No intraprocedure medications in log *           Anesthesia Record               Intraprocedure I/O Totals          Intake    sodium chloride 0.9% infusion 400.00 mL    ceFAZolin in dextrose (iso-os) (Ancef) IVPB 2 g 100.00 mL    Total Intake 500 mL          Specimen: No specimens collected     Staff:   Circulator: Blanche Freedman RN  Scrub Person: Kiana Singh         Drains and/or Catheters: * None in log *    Tourniquet Times:         Implants:     Findings: normal bladder    Indications: Viki Kumar is an 74 y.o. female who is having surgery for SIMON (stress urinary incontinence, female) [N39.3].     The patient was seen in the preoperative area. The risks, benefits, complications, treatment options, non-operative alternatives, expected recovery and outcomes were discussed with the patient. The possibilities of reaction to medication, pulmonary aspiration, injury to surrounding structures, bleeding, recurrent infection, the need for additional procedures, failure to diagnose a condition, and creating a complication requiring transfusion or operation were discussed with the patient. The patient concurred with the proposed plan, giving informed  consent.  The site of surgery was properly noted/marked if necessary per policy. The patient has been actively warmed in preoperative area. Preoperative antibiotics have been ordered and given within 1 hours of incision. Venous thrombosis prophylaxis have been ordered including bilateral sequential compression devices    Procedure Details: Two sites were identified with each site 2.5 cm. from the midline at the level of the symphysis pubis. 1% lidocaine with epinephrine was injected vaginally under the urethra in the vaginal epithelium and bilaterally in the direction of two periurethral tunnels that were about to be created for the sling.  At this point, a 2 cm. sagittal excision was performed vaginally, beginning 1 cm. beneath the urethral meatus. Sharp dissection was carried out bilaterally using Metzenbaum scissors and periurethral tunnels were created bilaterally.  A Vazquez catheter with a catheter guide was inserted into the bladder.   The TVT trocars were passed vaginally and retropubically with bladder deviation to the opposite side until it appeared suprapubically through a stab incision. The catheter was removed.  A cystoscopy was performed, which was entirely normal.  The bladder was drained.   The trocars were brought up entirely suprapubically and excised.  The end of the mesh was held in place with a Drea clamp.   The end of the mesh was held in place with a Drea clamp. Tension was adjusted on the mesh by drawing up on the suprapubic ends with a #8 Hegar dilator maintained between the tape and the urethra. The plastic sheaths were removed bilaterally.  The excess mesh was excised suprapubically.  The suprapubic sites were closed with interrupted stitches of 4-0 Vicryl suture.  The vaginal incision was closed with a running stitch of 0 Vicryl suture.    Complications:  None; patient tolerated the procedure well.    Disposition: PACU - hemodynamically stable.  Condition: stable         Additional  Details:     Attending Attestation: I was present and scrubbed for the entire procedure.    Manjit Patton  Phone Number: [phone]

## 2024-05-18 LAB
EST. AVERAGE GLUCOSE BLD GHB EST-MCNC: 123 MG/DL
HBA1C MFR BLD: 5.9 %

## 2024-05-20 ASSESSMENT — PAIN SCALES - GENERAL: PAINLEVEL_OUTOF10: 3

## 2024-06-03 ENCOUNTER — DOCUMENTATION (OUTPATIENT)
Dept: UROLOGY | Facility: CLINIC | Age: 74
End: 2024-06-03
Payer: MEDICARE

## 2024-06-06 ENCOUNTER — OFFICE VISIT (OUTPATIENT)
Dept: PRIMARY CARE | Facility: CLINIC | Age: 74
End: 2024-06-06
Payer: MEDICARE

## 2024-06-06 VITALS
DIASTOLIC BLOOD PRESSURE: 78 MMHG | WEIGHT: 178 LBS | TEMPERATURE: 96.8 F | HEIGHT: 64 IN | HEART RATE: 74 BPM | OXYGEN SATURATION: 99 % | RESPIRATION RATE: 16 BRPM | BODY MASS INDEX: 30.39 KG/M2 | SYSTOLIC BLOOD PRESSURE: 138 MMHG

## 2024-06-06 DIAGNOSIS — Z12.11 COLON CANCER SCREENING: ICD-10-CM

## 2024-06-06 DIAGNOSIS — F33.1 MODERATE EPISODE OF RECURRENT MAJOR DEPRESSIVE DISORDER (MULTI): ICD-10-CM

## 2024-06-06 DIAGNOSIS — I49.9 IRREGULAR HEART BEAT: ICD-10-CM

## 2024-06-06 DIAGNOSIS — M85.051: ICD-10-CM

## 2024-06-06 DIAGNOSIS — J43.9 PULMONARY EMPHYSEMA, UNSPECIFIED EMPHYSEMA TYPE (MULTI): ICD-10-CM

## 2024-06-06 DIAGNOSIS — Z00.00 ROUTINE GENERAL MEDICAL EXAMINATION AT HEALTH CARE FACILITY: Primary | ICD-10-CM

## 2024-06-06 DIAGNOSIS — Z12.31 BREAST CANCER SCREENING BY MAMMOGRAM: ICD-10-CM

## 2024-06-06 DIAGNOSIS — F41.1 GENERALIZED ANXIETY DISORDER: ICD-10-CM

## 2024-06-06 PROBLEM — M81.0 AGE-RELATED OSTEOPOROSIS WITHOUT CURRENT PATHOLOGICAL FRACTURE: Status: ACTIVE | Noted: 2024-06-06

## 2024-06-06 PROCEDURE — 1126F AMNT PAIN NOTED NONE PRSNT: CPT

## 2024-06-06 PROCEDURE — 1036F TOBACCO NON-USER: CPT

## 2024-06-06 PROCEDURE — 1123F ACP DISCUSS/DSCN MKR DOCD: CPT

## 2024-06-06 PROCEDURE — 1159F MED LIST DOCD IN RCRD: CPT

## 2024-06-06 PROCEDURE — 1170F FXNL STATUS ASSESSED: CPT

## 2024-06-06 PROCEDURE — 1160F RVW MEDS BY RX/DR IN RCRD: CPT

## 2024-06-06 PROCEDURE — 99397 PER PM REEVAL EST PAT 65+ YR: CPT

## 2024-06-06 PROCEDURE — G0439 PPPS, SUBSEQ VISIT: HCPCS

## 2024-06-06 PROCEDURE — 93000 ELECTROCARDIOGRAM COMPLETE: CPT

## 2024-06-06 RX ORDER — FLUTICASONE PROPIONATE AND SALMETEROL 500; 50 UG/1; UG/1
1 POWDER RESPIRATORY (INHALATION)
Qty: 60 EACH | Refills: 11 | Status: SHIPPED | OUTPATIENT
Start: 2024-06-06 | End: 2025-06-06

## 2024-06-06 RX ORDER — SERTRALINE HYDROCHLORIDE 100 MG/1
150 TABLET, FILM COATED ORAL DAILY
Qty: 135 TABLET | Refills: 3 | Status: SHIPPED | OUTPATIENT
Start: 2024-06-06 | End: 2025-06-06

## 2024-06-06 RX ORDER — TIOTROPIUM BROMIDE 18 UG/1
1 CAPSULE ORAL; RESPIRATORY (INHALATION)
Qty: 30 CAPSULE | Refills: 11 | Status: SHIPPED | OUTPATIENT
Start: 2024-06-06

## 2024-06-06 SDOH — ECONOMIC STABILITY: FOOD INSECURITY: WITHIN THE PAST 12 MONTHS, THE FOOD YOU BOUGHT JUST DIDN'T LAST AND YOU DIDN'T HAVE MONEY TO GET MORE.: NEVER TRUE

## 2024-06-06 SDOH — ECONOMIC STABILITY: FOOD INSECURITY: WITHIN THE PAST 12 MONTHS, YOU WORRIED THAT YOUR FOOD WOULD RUN OUT BEFORE YOU GOT MONEY TO BUY MORE.: NEVER TRUE

## 2024-06-06 ASSESSMENT — ANXIETY QUESTIONNAIRES
1. FEELING NERVOUS, ANXIOUS, OR ON EDGE: NOT AT ALL
3. WORRYING TOO MUCH ABOUT DIFFERENT THINGS: NOT AT ALL
7. FEELING AFRAID AS IF SOMETHING AWFUL MIGHT HAPPEN: NOT AT ALL
5. BEING SO RESTLESS THAT IT IS HARD TO SIT STILL: NOT AT ALL
IF YOU CHECKED OFF ANY PROBLEMS ON THIS QUESTIONNAIRE, HOW DIFFICULT HAVE THESE PROBLEMS MADE IT FOR YOU TO DO YOUR WORK, TAKE CARE OF THINGS AT HOME, OR GET ALONG WITH OTHER PEOPLE: NOT DIFFICULT AT ALL
GAD7 TOTAL SCORE: 0
4. TROUBLE RELAXING: NOT AT ALL
6. BECOMING EASILY ANNOYED OR IRRITABLE: NOT AT ALL
2. NOT BEING ABLE TO STOP OR CONTROL WORRYING: NOT AT ALL

## 2024-06-06 ASSESSMENT — PATIENT HEALTH QUESTIONNAIRE - PHQ9
4. FEELING TIRED OR HAVING LITTLE ENERGY: NEARLY EVERY DAY
SUM OF ALL RESPONSES TO PHQ9 QUESTIONS 1 & 2: 6
SUM OF ALL RESPONSES TO PHQ QUESTIONS 1-9: 18
10. IF YOU CHECKED OFF ANY PROBLEMS, HOW DIFFICULT HAVE THESE PROBLEMS MADE IT FOR YOU TO DO YOUR WORK, TAKE CARE OF THINGS AT HOME, OR GET ALONG WITH OTHER PEOPLE: SOMEWHAT DIFFICULT
5. POOR APPETITE OR OVEREATING: NOT AT ALL
2. FEELING DOWN, DEPRESSED OR HOPELESS: NEARLY EVERY DAY
7. TROUBLE CONCENTRATING ON THINGS, SUCH AS READING THE NEWSPAPER OR WATCHING TELEVISION: NEARLY EVERY DAY
8. MOVING OR SPEAKING SO SLOWLY THAT OTHER PEOPLE COULD HAVE NOTICED. OR THE OPPOSITE, BEING SO FIGETY OR RESTLESS THAT YOU HAVE BEEN MOVING AROUND A LOT MORE THAN USUAL: NEARLY EVERY DAY
1. LITTLE INTEREST OR PLEASURE IN DOING THINGS: NEARLY EVERY DAY
3. TROUBLE FALLING OR STAYING ASLEEP: NEARLY EVERY DAY
9. THOUGHTS THAT YOU WOULD BE BETTER OFF DEAD, OR OF HURTING YOURSELF: NOT AT ALL
6. FEELING BAD ABOUT YOURSELF - OR THAT YOU ARE A FAILURE OR HAVE LET YOURSELF OR YOUR FAMILY DOWN: NOT AT ALL

## 2024-06-06 ASSESSMENT — ENCOUNTER SYMPTOMS
PALPITATIONS: 1
NEUROLOGICAL NEGATIVE: 1
DEPRESSION: 0
LOSS OF SENSATION IN FEET: 0
GASTROINTESTINAL NEGATIVE: 1
MUSCULOSKELETAL NEGATIVE: 1
PSYCHIATRIC NEGATIVE: 1
ENDOCRINE NEGATIVE: 1
OCCASIONAL FEELINGS OF UNSTEADINESS: 0
HEMATOLOGIC/LYMPHATIC NEGATIVE: 1
CONSTITUTIONAL NEGATIVE: 1
COUGH: 1
EYES NEGATIVE: 1
SHORTNESS OF BREATH: 1

## 2024-06-06 ASSESSMENT — ACTIVITIES OF DAILY LIVING (ADL)
GROCERY_SHOPPING: INDEPENDENT
DOING_HOUSEWORK: INDEPENDENT
MANAGING_FINANCES: INDEPENDENT
BATHING: INDEPENDENT
TAKING_MEDICATION: INDEPENDENT
DRESSING: INDEPENDENT

## 2024-06-06 ASSESSMENT — LIFESTYLE VARIABLES
HOW OFTEN DO YOU HAVE SIX OR MORE DRINKS ON ONE OCCASION: NEVER
SKIP TO QUESTIONS 9-10: 1
HOW MANY STANDARD DRINKS CONTAINING ALCOHOL DO YOU HAVE ON A TYPICAL DAY: PATIENT DOES NOT DRINK
AUDIT-C TOTAL SCORE: 0
HOW OFTEN DO YOU HAVE A DRINK CONTAINING ALCOHOL: NEVER

## 2024-06-06 ASSESSMENT — PAIN SCALES - GENERAL: PAINLEVEL: 0-NO PAIN

## 2024-06-06 NOTE — PROGRESS NOTES
Subjective   Patient ID: Viki Kumar is a 74 y.o. female who presents for medicare wellness visit and follow up .    Continues to have shoulder pain, was referred to orthopedics at last visit but hasn't scheduled visit. Thinks that the process of fixing pain will be worse than what she's dealing with. Will let me know if she wants to return to ortho.     Taking melatonin for sleep, getting about 6 hours sleep per night. Anxiety keeps her from trying new medications.     Taking Crestor every other day, has concerns about taking this all the time.     Diet: Eating a good amount of veggies, fruits and protein. Ice cream nightly, 1-2 big can soda per day Drinking half per sitting. Drinking 2 cups coffee per day  Exercise: No regular exercise due to back pain and balance issues  Weight: Stable  Water: Drinking 6-8 bottles per day   Sleep: Bad sleep, thinks she might be getting too much sleep. Napping during the day. Goes to bed around 9P, sometimes already in bed. Waking up around 8:30AM, waking up frequently through the night. Has to urinate overnight  Social: , son lives next door. Daughter recently moved to [place].   Professional: Retired /    Review of Systems   Constitutional: Negative.    HENT: Negative.     Eyes: Negative.    Respiratory:  Positive for cough and shortness of breath.    Cardiovascular:  Positive for palpitations.   Gastrointestinal: Negative.    Endocrine: Negative.    Genitourinary: Negative.    Musculoskeletal: Negative.    Skin: Negative.    Neurological: Negative.    Hematological: Negative.    Psychiatric/Behavioral: Negative.          Current Outpatient Medications   Medication Sig Dispense Refill    ascorbic acid (VITAMIN C ORAL) Take by mouth.      biotin 1 mg capsule Take by mouth.      polyethylene glycol (Glycolax, Miralax) 17 gram/dose powder Take 17 g by mouth once daily. 510 g 0    rosuvastatin (Crestor) 10 mg tablet Take 1 tablet (10 mg) by mouth  once daily. 90 tablet 3    turmeric 400 mg capsule Take by mouth.      VITAMIN B COMPLEX ORAL Take by mouth.      ZINC ORAL Take 100 mg by mouth.      acetaminophen (Tylenol) 500 mg tablet Take 2 tablets (1,000 mg) by mouth every 6 hours if needed for mild pain (1 - 3). 30 tablet 0    albuterol 2.5 mg /3 mL (0.083 %) nebulizer solution Take 3 mL (2.5 mg) by nebulization 4 times a day as needed for wheezing or shortness of breath. 100 mL 11    albuterol 90 mcg/actuation inhaler Inhale 1 puff every 4 hours if needed for wheezing or shortness of breath. 18 g 11    CALCIUM CITRATE ORAL Take by mouth.      denosumab (Prolia) 60 mg/mL syringe Inject 1 mL (60 mg) under the skin 1 time for 1 dose. (Patient not taking: Reported on 6/6/2024) 1 mL 0    docosahexaenoic acid/epa (FISH OIL ORAL) Take by mouth.      fluticasone propion-salmeteroL (Wixela Inhub) 500-50 mcg/dose diskus inhaler Inhale 1 puff 2 times a day. Rinse mouth with water after use to reduce aftertaste and incidence of candidiasis. Do not swallow. 60 each 11    ibuprofen 200 mg tablet Take 1 tablet (200 mg) by mouth every 2 hours if needed.      MAGNESIUM ORAL Take 200 mg by mouth.      MILK THISTLE ORAL Take 1,000 mg by mouth.      sertraline (Zoloft) 100 mg tablet Take 1.5 tablets (150 mg) by mouth once daily. 135 tablet 3    tiotropium (Spiriva with HandiHaler) 18 mcg inhalation capsule Place 1 capsule (18 mcg) into inhaler and inhale once daily. 30 capsule 11     No current facility-administered medications for this visit.     Past Surgical History:   Procedure Laterality Date    OTHER SURGICAL HISTORY  12/28/2018    Hernia repair    OTHER SURGICAL HISTORY  12/28/2018    Tonsillectomy    OTHER SURGICAL HISTORY  12/28/2018    Hip surgery    OTHER SURGICAL HISTORY  12/31/2018    Bone transplantation autograft     Family History   Problem Relation Name Age of Onset    Other (SMALL CELL CARCINOMA) Mother      Emphysema Mother      Emphysema Father      Heart  "failure Father      Emphysema Brother Mayo     Lung cancer Brother Mayo     Coronary artery disease Brother Mayo     Other (cardiac bypass) Brother Mayo     Other (shoulder replacement) Brother Timoteo     Gout Brother Timoteo     Memory loss Brother Manjit     Gout Brother Manjit     Accidental death Brother Amilcar       Social History     Tobacco Use    Smoking status: Former     Current packs/day: 0.00     Average packs/day: 1 pack/day for 50.0 years (50.0 ttl pk-yrs)     Types: Cigarettes     Start date: 10/1/1972     Quit date: 10/1/2022     Years since quittin.6     Passive exposure: Past    Smokeless tobacco: Never   Vaping Use    Vaping status: Former   Substance Use Topics    Alcohol use: Not Currently     Comment: occassionally    Drug use: Never     Comment: CBD gummies        Objective     Visit Vitals  /78 (BP Location: Left arm, Patient Position: Sitting, BP Cuff Size: Adult long)   Pulse 74   Temp 36 °C (96.8 °F) (Temporal)   Resp 16   Ht 1.626 m (5' 4\")   Wt 80.7 kg (178 lb)   SpO2 99%   BMI 30.55 kg/m²   OB Status Postmenopausal   Smoking Status Former   BSA 1.91 m²        Physical Exam  Constitutional:       Appearance: Normal appearance.   HENT:      Head: Normocephalic and atraumatic.   Eyes:      Extraocular Movements: Extraocular movements intact.      Pupils: Pupils are equal, round, and reactive to light.   Cardiovascular:      Rate and Rhythm: Normal rate. Rhythm irregularly irregular.   Pulmonary:      Effort: Pulmonary effort is normal.      Breath sounds: Normal breath sounds.   Abdominal:      General: Abdomen is flat. Bowel sounds are normal.      Palpations: Abdomen is soft.   Musculoskeletal:         General: Normal range of motion.   Skin:     General: Skin is warm and dry.      Capillary Refill: Capillary refill takes less than 2 seconds.   Neurological:      General: No focal deficit present.      Mental Status: She is alert and oriented to person, place, and time. "   Psychiatric:         Mood and Affect: Mood normal.         Behavior: Behavior normal.           Assessment/Plan   Problem List Items Addressed This Visit       Chronic obstructive pulmonary disease (Multi)    Relevant Medications    fluticasone propion-salmeteroL (Wixela Inhub) 500-50 mcg/dose diskus inhaler    tiotropium (Spiriva with HandiHaler) 18 mcg inhalation capsule    Generalized anxiety disorder    Relevant Medications    sertraline (Zoloft) 100 mg tablet    Monostotic fibrous dysplasia of thigh, right     Bone density scan with osteoporosis  Never started on Prolia injections  Will discuss with ortho to see if she would benefit from Prolia injections         Depression     Reports sitting around a lot, previously a very active person  Didn't try aripiprazole that was ordered in December 2023    Increase sertraline to 150mg daily          Other Visit Diagnoses       Routine general medical examination at health care facility    -  Primary    Colon cancer screening        Relevant Orders    Colonoscopy Screening; High Risk Patient; Multiple tubular adenomatous polyps on colonscopy from 2023, rec'd for 1 year repeat study per Dr. Kinsey    Breast cancer screening by mammogram        Relevant Orders    BI mammo bilateral screening tomosynthesis    Irregular heart beat        Patient c/o palpitations on and off  Irregular heart rhythm, regular rate noted on auscultation  IO EKG with PAC's  Deferring bblock, cards referral    Relevant Orders    ECG 12 lead (Clinic Performed) (Completed)            All pertinent lab work and results were reviewed with patient.     Follow up with me in 3 months    CLAY Waller-CNS

## 2024-06-06 NOTE — ASSESSMENT & PLAN NOTE
Flu vaccination: Previously given this season  PCV: Previously given  PPSV: Previously given  Shingrix vaccine: Previously given  Colon cancer screening: Colonoscopy in 2023 with multiple tubular adenomatous polyps; repeat study recommended for 2024, ordered today  Mammogram:  Last in 1/2023, repeat study ordered today  DEXA scan: Last in 7/2023, osteoporosis

## 2024-06-06 NOTE — PATIENT INSTRUCTIONS
Thank you for coming to see me today.  If you have any questions or concerns following our visit, please contact the office.  Phone: [phone]    Follow up with me in 3-4 months or sooner as needed    1)  INCREASE sertraline to 150mg daily, 1 1/2 tablets daily    2)  RESTART tiotropium (Spiriva) inhaler, two puffs daily.  Continue with Wixela inhaler    3) Please schedule a mammogram - please call [phone] or schedule at  on your way out today     4) You need colonoscopy screening- [facility] to get you scheduled for this

## 2024-06-06 NOTE — ASSESSMENT & PLAN NOTE
Bone density scan with osteoporosis  Never started on Prolia injections  Will discuss with ortho to see if she would benefit from Prolia injections

## 2024-06-06 NOTE — ASSESSMENT & PLAN NOTE
Reports sitting around a lot, previously a very active person  Didn't try aripiprazole that was ordered in December 2023    Increase sertraline to 150mg daily

## 2024-06-12 ENCOUNTER — TELEPHONE (OUTPATIENT)
Dept: GASTROENTEROLOGY | Facility: CLINIC | Age: 74
End: 2024-06-12
Payer: MEDICARE

## 2024-06-12 NOTE — TELEPHONE ENCOUNTER
----- Message from Antonia Julien MA sent at 6/12/2024 10:39 AM EDT -----  Regarding: RE: Open Access  OA COLONOSCOPY 7.9.24  ENDO  DR. KWASNICKA  [facility]  PACKET MAILED 6.12.24  ----- Message -----  From: Antonia Julien MA  Sent: 6/12/2024   9:33 AM EDT  To: [id number]  Subject: RE: Open Access                                  Left message on machine to return call     ----- Message -----  From: Lisa Cancino MA  Sent: 6/7/2024   2:47 PM EDT  To: [id number]  Subject: RE: Open Access                                  Contacted patient to schedule - pt was in car - requesting a call on Monday to schedule.  ----- Message -----  From: Mary White RN  Sent: 6/7/2024   2:12 PM EDT  To: [id number]er[facility]  Subject: Open Access                                      Open Access

## 2024-06-13 ENCOUNTER — HOSPITAL ENCOUNTER (EMERGENCY)
Facility: HOSPITAL | Age: 74
Discharge: HOME | End: 2024-06-13
Payer: MEDICARE

## 2024-06-13 ENCOUNTER — APPOINTMENT (OUTPATIENT)
Dept: RADIOLOGY | Facility: HOSPITAL | Age: 74
End: 2024-06-13
Payer: MEDICARE

## 2024-06-13 VITALS
OXYGEN SATURATION: 99 % | DIASTOLIC BLOOD PRESSURE: 80 MMHG | HEIGHT: 64 IN | RESPIRATION RATE: 18 BRPM | WEIGHT: 178 LBS | SYSTOLIC BLOOD PRESSURE: 141 MMHG | TEMPERATURE: 97.8 F | BODY MASS INDEX: 30.39 KG/M2 | HEART RATE: 58 BPM

## 2024-06-13 DIAGNOSIS — S80.00XA CONTUSION OF KNEE, UNSPECIFIED LATERALITY, INITIAL ENCOUNTER: Primary | ICD-10-CM

## 2024-06-13 PROCEDURE — 73564 X-RAY EXAM KNEE 4 OR MORE: CPT | Mod: LT

## 2024-06-13 PROCEDURE — 73564 X-RAY EXAM KNEE 4 OR MORE: CPT | Mod: RT

## 2024-06-13 PROCEDURE — 73564 X-RAY EXAM KNEE 4 OR MORE: CPT | Mod: LEFT SIDE | Performed by: STUDENT IN AN ORGANIZED HEALTH CARE EDUCATION/TRAINING PROGRAM

## 2024-06-13 PROCEDURE — 99284 EMERGENCY DEPT VISIT MOD MDM: CPT

## 2024-06-13 PROCEDURE — 2500000001 HC RX 250 WO HCPCS SELF ADMINISTERED DRUGS (ALT 637 FOR MEDICARE OP): Performed by: NURSE PRACTITIONER

## 2024-06-13 RX ORDER — HYDROCODONE BITARTRATE AND ACETAMINOPHEN 5; 325 MG/1; MG/1
1 TABLET ORAL EVERY 6 HOURS PRN
Qty: 12 TABLET | Refills: 0 | Status: SHIPPED | OUTPATIENT
Start: 2024-06-13 | End: 2024-06-16

## 2024-06-13 RX ORDER — HYDROCODONE BITARTRATE AND ACETAMINOPHEN 5; 325 MG/1; MG/1
1 TABLET ORAL ONCE
Status: COMPLETED | OUTPATIENT
Start: 2024-06-13 | End: 2024-06-13

## 2024-06-13 ASSESSMENT — LIFESTYLE VARIABLES
HAVE YOU EVER FELT YOU SHOULD CUT DOWN ON YOUR DRINKING: NO
EVER HAD A DRINK FIRST THING IN THE MORNING TO STEADY YOUR NERVES TO GET RID OF A HANGOVER: NO
HAVE PEOPLE ANNOYED YOU BY CRITICIZING YOUR DRINKING: NO
EVER FELT BAD OR GUILTY ABOUT YOUR DRINKING: NO
TOTAL SCORE: 0

## 2024-06-13 ASSESSMENT — PAIN DESCRIPTION - DESCRIPTORS: DESCRIPTORS: ACHING

## 2024-06-13 ASSESSMENT — PAIN DESCRIPTION - LOCATION
LOCATION: KNEE
LOCATION: KNEE

## 2024-06-13 ASSESSMENT — COLUMBIA-SUICIDE SEVERITY RATING SCALE - C-SSRS
1. IN THE PAST MONTH, HAVE YOU WISHED YOU WERE DEAD OR WISHED YOU COULD GO TO SLEEP AND NOT WAKE UP?: NO
2. HAVE YOU ACTUALLY HAD ANY THOUGHTS OF KILLING YOURSELF?: NO
6. HAVE YOU EVER DONE ANYTHING, STARTED TO DO ANYTHING, OR PREPARED TO DO ANYTHING TO END YOUR LIFE?: NO

## 2024-06-13 ASSESSMENT — PAIN DESCRIPTION - ORIENTATION: ORIENTATION: RIGHT

## 2024-06-13 ASSESSMENT — PAIN - FUNCTIONAL ASSESSMENT: PAIN_FUNCTIONAL_ASSESSMENT: 0-10

## 2024-06-13 ASSESSMENT — PAIN SCALES - GENERAL
PAINLEVEL_OUTOF10: 8
PAINLEVEL_OUTOF10: 8

## 2024-06-13 ASSESSMENT — PAIN DESCRIPTION - FREQUENCY: FREQUENCY: CONSTANT/CONTINUOUS

## 2024-06-13 NOTE — ED PROVIDER NOTES
HPI   Chief Complaint   Patient presents with    bilateral knee pain       This is a 74 y.o. female with a PMHx COPD, bipolar disorder, osteoarthritis, and urinary incontinence presenting with knee pain after a fall. She states she fell earlier this morning after tripping on a rug, landing on her knees. She was able to get up after the fall but has knee pain and difficulty ambulating. She does have a right arm bruise from the fall. She did not hit her head. She is not on blood thinners. There was no dizziness, palpitations, syncope, pre-syncope or seizure-like activity. She did not lose consciousness. There is no deformity but she states her right leg is painful to bend or move with decreased ROM. Her left knee is painful but she has full ROM. Denies other complaints such as chest pain, shortness of breath, muscle weakness, nausea, vomiting, or abdominal pain.       History provided by:  Patient   used: No                        Prabhjot Coma Scale Score: 15                     Patient History   Past Medical History:   Diagnosis Date    Actinic keratosis 02/28/2017    Arthralgia 01/20/2023    Bipolar disorder, unspecified (Multi)     Bipolar disease, chronic    Chronic obstructive pulmonary disease (Multi) 01/20/2023    COPD (chronic obstructive pulmonary disease) with acute bronchitis (Multi) 04/04/2023    Fecal incontinence 01/20/2023    Hemoptysis 04/12/2023    Lump of skin 04/12/2023    Other seborrheic keratosis 02/28/2017    Personal history of other diseases of the digestive system     History of gastroesophageal reflux (GERD)    Personal history of other mental and behavioral disorders     History of anxiety    Scar condition and fibrosis of skin 02/28/2017    Shortness of breath 03/14/2023    Shortness of breath at rest 02/22/2017    Skin changes due to chronic exposure to nonionizing radiation, unspecified 02/28/2017    Wheezing 02/22/2017     Past Surgical History:   Procedure  Laterality Date    OTHER SURGICAL HISTORY  2018    Hernia repair    OTHER SURGICAL HISTORY  2018    Tonsillectomy    OTHER SURGICAL HISTORY  2018    Hip surgery    OTHER SURGICAL HISTORY  2018    Bone transplantation autograft     Family History   Problem Relation Name Age of Onset    Other (SMALL CELL CARCINOMA) Mother      Emphysema Mother      Emphysema Father      Heart failure Father      Emphysema Brother Mayo     Lung cancer Brother Mayo     Coronary artery disease Brother Mayo     Other (cardiac bypass) Brother Mayo     Other (shoulder replacement) Brother Timoteo     Gout Brother Timoteo     Memory loss Brother Manjit     Gout Brother Manjit     Accidental death Brother Amilcar      Social History     Tobacco Use    Smoking status: Former     Current packs/day: 0.00     Average packs/day: 1 pack/day for 50.0 years (50.0 ttl pk-yrs)     Types: Cigarettes     Start date: 10/1/1972     Quit date: 10/1/2022     Years since quittin.7     Passive exposure: Past    Smokeless tobacco: Never   Vaping Use    Vaping status: Former   Substance Use Topics    Alcohol use: Not Currently     Comment: occassionally    Drug use: Never     Comment: CBD gummies       Physical Exam   ED Triage Vitals [24 1849]   Temperature Heart Rate Respirations BP   36.6 °C (97.8 °F) 58 18 141/80      Pulse Ox Temp Source Heart Rate Source Patient Position   99 % Temporal Monitor Sitting      BP Location FiO2 (%)     Left arm --       Physical Exam  Vitals and nursing note reviewed.   HENT:      Head: Normocephalic.   Cardiovascular:      Rate and Rhythm: Normal rate and regular rhythm.      Pulses: Normal pulses.      Heart sounds: Normal heart sounds.   Pulmonary:      Effort: Pulmonary effort is normal.      Breath sounds: Normal breath sounds.   Musculoskeletal:         General: Normal range of motion.      Cervical back: Normal range of motion.      Comments: No midline spinal tenderness.  There is no obvious  deformity of the right or left knee.  There is some mild diffuse swelling noted bilaterally with the left being greater than right.  The patient has a negative anterior/posterior drawer test bilaterally.  There is no laxity or pain with valgus or varus testing bilaterally.  The patient has significant pain and difficulty with flexion of the right knee.  Patient is able to move the left knee without difficulties.  Distal extremity with brisk cap refill and sensation intact.  No calf pain.   Skin:     General: Skin is warm.      Capillary Refill: Capillary refill takes less than 2 seconds.   Neurological:      General: No focal deficit present.      Mental Status: She is alert.   Psychiatric:         Mood and Affect: Mood normal.         ED Course & MDM   Diagnoses as of 06/13/24 2036   Contusion of knee, unspecified laterality, initial encounter       Medical Decision Making  The patient was seen and evaluated by the nurse practitioner, Ivy Rodriguez.  The patient is presenting to the emergency room complaints of bilateral knee pain status post fall.  The patient does not have any obvious deformity.  No evidence of septic joint.  X-rays of the bilateral knees were obtained and were negative.  Patient was medicated with 1 Norco tab p.o. in the emergency room.  The knees were wrapped with Ace wraps by nursing staff.  She was neurovascularly intact after wrap placement.  The patient was educated on rice therapy and provided prescription for Norco for home administration.  She was referred to orthopedics if she has persistent pain and 7 to 10 days.  She was encouraged to return if she has any worsening symptomatology.  She was discharged in stable condition with computer discharge instructions given.        Procedure  Procedures     CLAY Perry-CARMINA  06/13/24 2037

## 2024-06-18 ENCOUNTER — APPOINTMENT (OUTPATIENT)
Dept: UROLOGY | Facility: CLINIC | Age: 74
End: 2024-06-18
Payer: MEDICARE

## 2024-06-18 DIAGNOSIS — Z09 POSTOP CHECK: Primary | ICD-10-CM

## 2024-06-18 PROCEDURE — 1123F ACP DISCUSS/DSCN MKR DOCD: CPT | Performed by: STUDENT IN AN ORGANIZED HEALTH CARE EDUCATION/TRAINING PROGRAM

## 2024-06-18 PROCEDURE — 99024 POSTOP FOLLOW-UP VISIT: CPT | Performed by: STUDENT IN AN ORGANIZED HEALTH CARE EDUCATION/TRAINING PROGRAM

## 2024-06-18 NOTE — PROGRESS NOTES
HISTORY OF PRESENT ILLNESS:  Viki Kumar is a 74 y.o. female who presents today for a follow up visit.  She did not take the Metamucil or Myrbetriq, she states her bowel function is currently normal. She had sling placement 05/17/2024. She still experiences urge incontinence.  The stress incontinence is improved         Past Medical History  She has a past medical history of Actinic keratosis (02/28/2017), Arthralgia (01/20/2023), Bipolar disorder, unspecified (Multi), Chronic obstructive pulmonary disease (Multi) (01/20/2023), COPD (chronic obstructive pulmonary disease) with acute bronchitis (Multi) (04/04/2023), Fecal incontinence (01/20/2023), Hemoptysis (04/12/2023), Lump of skin (04/12/2023), Other seborrheic keratosis (02/28/2017), Personal history of other diseases of the digestive system, Personal history of other mental and behavioral disorders, Scar condition and fibrosis of skin (02/28/2017), Shortness of breath (03/14/2023), Shortness of breath at rest (02/22/2017), Skin changes due to chronic exposure to nonionizing radiation, unspecified (02/28/2017), and Wheezing (02/22/2017).    Surgical History  She has a past surgical history that includes Other surgical history (12/28/2018); Other surgical history (12/28/2018); Other surgical history (12/28/2018); and Other surgical history (12/31/2018).     Social History  She reports that she quit smoking about 20 months ago. Her smoking use included cigarettes. She started smoking about 51 years ago. She has a 50 pack-year smoking history. She has been exposed to tobacco smoke. She has never used smokeless tobacco. She reports that she does not currently use alcohol. She reports that she does not use drugs.    Family History  Family History   Problem Relation Name Age of Onset    Other (SMALL CELL CARCINOMA) Mother      Emphysema Mother      Emphysema Father      Heart failure Father      Emphysema Brother Mayo     Lung cancer Brother Mayo      "Coronary artery disease Brother Mayo     Other (cardiac bypass) Brother Mayo     Other (shoulder replacement) Brother Timoteo     Gout Brother Timoteo     Memory loss Brother Manjit     Gout Brother Manjit     Accidental death Brother Amilcar         Allergies  Patient has no known allergies.      A comprehensive 10+ review of systems was negative except for: see hpi                          PHYSICAL EXAMINATION:  BP Readings from Last 3 Encounters:   06/13/24 141/80   06/06/24 138/78   05/17/24 150/72      Wt Readings from Last 3 Encounters:   06/13/24 80.7 kg (178 lb)   06/06/24 80.7 kg (178 lb)   03/21/24 82.5 kg (181 lb 14.4 oz)      BMI: Estimated body mass index is 30.55 kg/m² as calculated from the following:    Height as of 6/13/24: 1.626 m (5' 4\").    Weight as of 6/13/24: 80.7 kg (178 lb).  BSA: Estimated body surface area is 1.91 meters squared as calculated from the following:    Height as of 6/13/24: 1.626 m (5' 4\").    Weight as of 6/13/24: 80.7 kg (178 lb).  HEENT: Normocephalic, atraumatic, PER EOMI, nonicteric, trachea normal, thyroid normal, oropharynx normal.  CARDIAC: regular rate & rhythm, S1 & S2 normal.  No heaves, thrills, gallops or murmurs.  LUNGS: Clear to auscultation, no spinal or CV tenderness.  EXTREMITIES: No evidence of cyanosis, clubbing or edema.               Assessment:  Viki Kumar is a 74 y.o. who presents with urgency incontinence, fecal incontinence mixed incontinence with positive CST and fecal incontinence         SHIELA   Failed Myrbetriq    , Positive CST, status post sling 5/17/2024    Stress incontinence improved    Urgency better but still having it, we will try gemtesa          Fecal incontinence:  Currently this is well-controlled off meds     Follow up in 6 weeks    All questions and concerns were answered and addressed.  The patient expressed understanding and agrees with the plan.     Manjit Patton MD    Scribe Attestation  By signing my name below, I, Joann Longoria " , Scribe   attest that this documentation has been prepared under the direction and in the presence of Manjit Patton MD.

## 2024-06-20 ENCOUNTER — HOSPITAL ENCOUNTER (OUTPATIENT)
Dept: RADIOLOGY | Facility: CLINIC | Age: 74
Discharge: HOME | End: 2024-06-20
Payer: MEDICARE

## 2024-06-20 VITALS — BODY MASS INDEX: 29.88 KG/M2 | WEIGHT: 175 LBS | HEIGHT: 64 IN

## 2024-06-20 DIAGNOSIS — Z12.31 BREAST CANCER SCREENING BY MAMMOGRAM: ICD-10-CM

## 2024-06-20 PROCEDURE — 77063 BREAST TOMOSYNTHESIS BI: CPT

## 2024-06-20 PROCEDURE — 77067 SCR MAMMO BI INCL CAD: CPT

## 2024-07-08 ENCOUNTER — ANESTHESIA EVENT (OUTPATIENT)
Dept: GASTROENTEROLOGY | Facility: HOSPITAL | Age: 74
End: 2024-07-08
Payer: MEDICARE

## 2024-07-09 ENCOUNTER — ANESTHESIA (OUTPATIENT)
Dept: GASTROENTEROLOGY | Facility: HOSPITAL | Age: 74
End: 2024-07-09
Payer: MEDICARE

## 2024-07-09 ENCOUNTER — HOSPITAL ENCOUNTER (OUTPATIENT)
Dept: GASTROENTEROLOGY | Facility: HOSPITAL | Age: 74
Discharge: HOME | End: 2024-07-09
Payer: MEDICARE

## 2024-07-09 VITALS
BODY MASS INDEX: 29.88 KG/M2 | TEMPERATURE: 97 F | RESPIRATION RATE: 16 BRPM | HEIGHT: 64 IN | OXYGEN SATURATION: 94 % | SYSTOLIC BLOOD PRESSURE: 136 MMHG | WEIGHT: 175 LBS | DIASTOLIC BLOOD PRESSURE: 80 MMHG | HEART RATE: 82 BPM

## 2024-07-09 DIAGNOSIS — Z12.11 COLON CANCER SCREENING: ICD-10-CM

## 2024-07-09 PROCEDURE — 2500000004 HC RX 250 GENERAL PHARMACY W/ HCPCS (ALT 636 FOR OP/ED): Performed by: NURSE ANESTHETIST, CERTIFIED REGISTERED

## 2024-07-09 PROCEDURE — 7100000010 HC PHASE TWO TIME - EACH INCREMENTAL 1 MINUTE

## 2024-07-09 PROCEDURE — 3700000002 HC GENERAL ANESTHESIA TIME - EACH INCREMENTAL 1 MINUTE

## 2024-07-09 PROCEDURE — 3700000001 HC GENERAL ANESTHESIA TIME - INITIAL BASE CHARGE

## 2024-07-09 PROCEDURE — 45385 COLONOSCOPY W/LESION REMOVAL: CPT | Performed by: INTERNAL MEDICINE

## 2024-07-09 PROCEDURE — 2500000004 HC RX 250 GENERAL PHARMACY W/ HCPCS (ALT 636 FOR OP/ED): Performed by: INTERNAL MEDICINE

## 2024-07-09 PROCEDURE — 2500000005 HC RX 250 GENERAL PHARMACY W/O HCPCS: Performed by: NURSE ANESTHETIST, CERTIFIED REGISTERED

## 2024-07-09 PROCEDURE — 7100000009 HC PHASE TWO TIME - INITIAL BASE CHARGE

## 2024-07-09 PROCEDURE — 2720000007 HC OR 272 NO HCPCS

## 2024-07-09 RX ORDER — LIDOCAINE HYDROCHLORIDE 20 MG/ML
INJECTION, SOLUTION EPIDURAL; INFILTRATION; INTRACAUDAL; PERINEURAL AS NEEDED
Status: DISCONTINUED | OUTPATIENT
Start: 2024-07-09 | End: 2024-07-09

## 2024-07-09 RX ORDER — FENTANYL CITRATE 50 UG/ML
INJECTION, SOLUTION INTRAMUSCULAR; INTRAVENOUS AS NEEDED
Status: DISCONTINUED | OUTPATIENT
Start: 2024-07-09 | End: 2024-07-09

## 2024-07-09 RX ORDER — PROPOFOL 10 MG/ML
INJECTION, EMULSION INTRAVENOUS AS NEEDED
Status: DISCONTINUED | OUTPATIENT
Start: 2024-07-09 | End: 2024-07-09

## 2024-07-09 RX ORDER — SODIUM CHLORIDE 9 MG/ML
20 INJECTION, SOLUTION INTRAVENOUS CONTINUOUS
Status: DISCONTINUED | OUTPATIENT
Start: 2024-07-09 | End: 2024-07-10 | Stop reason: HOSPADM

## 2024-07-09 SDOH — HEALTH STABILITY: MENTAL HEALTH: CURRENT SMOKER: 0

## 2024-07-09 ASSESSMENT — PAIN SCALES - GENERAL
PAINLEVEL_OUTOF10: 0 - NO PAIN
PAIN_LEVEL: 0
PAINLEVEL_OUTOF10: 0 - NO PAIN

## 2024-07-09 ASSESSMENT — COLUMBIA-SUICIDE SEVERITY RATING SCALE - C-SSRS
2. HAVE YOU ACTUALLY HAD ANY THOUGHTS OF KILLING YOURSELF?: NO
6. HAVE YOU EVER DONE ANYTHING, STARTED TO DO ANYTHING, OR PREPARED TO DO ANYTHING TO END YOUR LIFE?: NO
1. IN THE PAST MONTH, HAVE YOU WISHED YOU WERE DEAD OR WISHED YOU COULD GO TO SLEEP AND NOT WAKE UP?: NO

## 2024-07-09 ASSESSMENT — PAIN - FUNCTIONAL ASSESSMENT
PAIN_FUNCTIONAL_ASSESSMENT: 0-10

## 2024-07-09 NOTE — ANESTHESIA PREPROCEDURE EVALUATION
Patient: Viki Kumar    Procedure Information       Date/Time: 07/09/24 0900    Scheduled providers: Tristan Kinsey DO    Procedure: COLONOSCOPY    Location: [facility]            Relevant Problems   Anesthesia (within normal limits)      Cardiac   (+) Hyperlipidemia      Pulmonary   (+) Chronic obstructive pulmonary disease (Multi)      Neuro   (+) Depression   (+) Generalized anxiety disorder      GI (within normal limits)      /Renal (within normal limits)      Liver (within normal limits)      Endocrine (within normal limits)      Hematology (within normal limits)      Musculoskeletal   (+) Degeneration of intervertebral disc of lumbar region   (+) Lumbar spondylosis      HEENT (within normal limits)       Clinical information reviewed:   Tobacco  Allergies  Meds   Med Hx  Surg Hx  OB Status  Fam Hx  Soc   Hx        NPO Detail:  NPO/Void Status  Date of Last Liquid: 07/09/24  Time of Last Liquid: 0700  Date of Last Solid: 07/07/24  Last Intake Type: Clear fluids         Physical Exam    Airway  Mallampati: III  TM distance: >3 FB  Neck ROM: full     Cardiovascular - normal exam  Rhythm: regular     Dental - normal exam     Pulmonary - normal exam     Abdominal - normal exam         Anesthesia Plan    History of general anesthesia?: yes  History of complications of general anesthesia?: no    ASA 3     MAC     The patient is not a current smoker.    intravenous induction   Anesthetic plan and risks discussed with patient.

## 2024-07-09 NOTE — ANESTHESIA POSTPROCEDURE EVALUATION
Patient: Viki Kumar    Procedure Summary       Date: 07/09/24 Room / Location: [facility]    Anesthesia Start: 0855 Anesthesia Stop: 0924    Procedure: COLONOSCOPY Diagnosis: Colon cancer screening    Scheduled Providers: Tristan Kinsey DO Responsible Provider: PRO Garcia    Anesthesia Type: MAC ASA Status: 3            Anesthesia Type: MAC    Vitals Value Taken Time   /79 07/09/24 0924   Temp 36.1 °C (97 °F) 07/09/24 0924   Pulse 82 07/09/24 0924   Resp 16 07/09/24 0924   SpO2 92 % 07/09/24 0924       Anesthesia Post Evaluation    Patient location during evaluation: bedside  Patient participation: complete - patient participated  Level of consciousness: awake and alert  Pain score: 0  Pain management: adequate  Airway patency: patent  Cardiovascular status: acceptable  Respiratory status: acceptable  Hydration status: acceptable  Postoperative Nausea and Vomiting: none    There were no known notable events for this encounter.

## 2024-07-17 LAB
LABORATORY COMMENT REPORT: NORMAL
PATH REPORT.FINAL DX SPEC: NORMAL
PATH REPORT.GROSS SPEC: NORMAL
PATH REPORT.RELEVANT HX SPEC: NORMAL
PATH REPORT.TOTAL CANCER: NORMAL

## 2024-07-30 ENCOUNTER — TELEPHONE (OUTPATIENT)
Dept: PRIMARY CARE | Facility: CLINIC | Age: 74
End: 2024-07-30
Payer: MEDICARE

## 2024-07-30 DIAGNOSIS — F17.210 TOBACCO DEPENDENCE DUE TO CIGARETTES: ICD-10-CM

## 2024-07-30 DIAGNOSIS — Z12.2 ENCOUNTER FOR SCREENING FOR LUNG CANCER: Primary | ICD-10-CM

## 2024-07-30 NOTE — TELEPHONE ENCOUNTER
Talked with pt and gave Jeanna French's message.  Pt is calling scheduling to schedule repeat CT.  Pt understood and no questions at this time.

## 2024-07-30 NOTE — TELEPHONE ENCOUNTER
----- Message from Jeanna French sent at 7/30/2024  8:56 AM EDT -----  Regarding: RE: patient due for annual lung cancer screening CT  Clinical team can you please call patient and let her know she should have repeat low dose CT scan for lung cancer? Order is placed, thank you!  Jeanna  ----- Message -----  From: Peggy Mcmahon RN  Sent: 7/29/2024   6:20 PM EDT  To: Jeanna French, APRN-CNS  Subject: patient due for annual lung cancer screening#    This patient needs to return for an annual CT low dose lung screening ([id number]), which is/was due 7/28/24.  Would you please place an order and have your staff inform the patient?       The patient can schedule the CT through [employer] or by calling [phone].  We suggest scheduling at least 2 weeks out in order to allow time for insurance approval.     Thank you,     Peggy Mcmahon RN  Lung Cancer Screening Navigation Team   [phone]

## 2024-08-01 ENCOUNTER — TELEPHONE (OUTPATIENT)
Dept: PRIMARY CARE | Facility: CLINIC | Age: 74
End: 2024-08-01
Payer: MEDICARE

## 2024-08-01 DIAGNOSIS — F41.9 ANXIETY: Primary | ICD-10-CM

## 2024-08-01 RX ORDER — BUSPIRONE HYDROCHLORIDE 10 MG/1
10 TABLET ORAL 3 TIMES DAILY
Qty: 30 TABLET | Refills: 0 | Status: SHIPPED | OUTPATIENT
Start: 2024-08-01 | End: 2025-08-01

## 2024-08-01 NOTE — TELEPHONE ENCOUNTER
I'd like her to try buspirone 10mg up to three times daily as needed before we jump to a benzo. I sent to pharmacy, she should try it and let me know how this is working at [facility] on the 6th.

## 2024-08-01 NOTE — TELEPHONE ENCOUNTER
Pt called in and stated she has had an increase in Anxiety for the past couple of weeks. Pt states it has been steady and has not declined at all. Pt is requesting Alprazolam for this, if possible. Pt does have an appointment scheduled for 8/6. Please advise.     Pt's pharmacy is [facility]

## 2024-08-05 PROBLEM — M47.816 SPONDYLOSIS OF LUMBAR SPINE: Status: ACTIVE | Noted: 2021-10-19

## 2024-08-05 PROBLEM — R22.9 MASS OF SKIN: Status: ACTIVE | Noted: 2024-08-05

## 2024-08-05 PROBLEM — R04.2 HEMOPTYSIS: Status: ACTIVE | Noted: 2024-08-05

## 2024-08-05 PROBLEM — R15.9 INCONTINENCE OF FECES: Status: ACTIVE | Noted: 2024-08-05

## 2024-08-05 PROBLEM — H92.01 RIGHT EAR PAIN: Status: ACTIVE | Noted: 2024-08-05

## 2024-08-05 PROBLEM — G83.10 PARESIS OF SINGLE LOWER EXTREMITY (MULTI): Status: ACTIVE | Noted: 2024-08-05

## 2024-08-05 PROBLEM — M62.81 MUSCLE WEAKNESS OF EXTREMITY: Status: ACTIVE | Noted: 2024-08-05

## 2024-08-05 PROBLEM — J30.1 ALLERGIC RHINITIS DUE TO POLLEN: Status: ACTIVE | Noted: 2024-08-05

## 2024-08-05 PROBLEM — N30.00 ACUTE CYSTITIS: Status: ACTIVE | Noted: 2024-08-05

## 2024-08-05 PROBLEM — R05.3 CHRONIC COUGH: Status: ACTIVE | Noted: 2023-03-14

## 2024-08-05 PROBLEM — M25.511 PAIN OF RIGHT SHOULDER REGION: Status: ACTIVE | Noted: 2024-08-05

## 2024-08-05 PROBLEM — R06.02 SHORTNESS OF BREATH AT REST: Status: ACTIVE | Noted: 2024-08-05

## 2024-08-05 PROBLEM — M62.81 MUSCLE WEAKNESS: Status: ACTIVE | Noted: 2024-08-05

## 2024-08-05 PROBLEM — M25.559 ARTHRALGIA OF HIP: Status: ACTIVE | Noted: 2024-08-05

## 2024-08-05 PROBLEM — F31.9 BIPOLAR DISORDER (MULTI): Status: ACTIVE | Noted: 2023-05-10

## 2024-08-05 PROBLEM — R41.3 AMNESIA: Status: ACTIVE | Noted: 2024-08-05

## 2024-08-05 PROBLEM — K57.30 DIVERTICULOSIS OF LARGE INTESTINE WITHOUT HEMORRHAGE: Status: ACTIVE | Noted: 2023-05-10

## 2024-08-05 PROBLEM — R05.9 COUGH, UNSPECIFIED: Status: ACTIVE | Noted: 2023-03-14

## 2024-08-05 PROBLEM — Z86.39 HISTORY OF ELEVATED LIPIDS: Status: ACTIVE | Noted: 2024-08-05

## 2024-08-05 PROBLEM — R73.01 IMPAIRED FASTING GLUCOSE: Status: ACTIVE | Noted: 2024-08-05

## 2024-08-05 PROBLEM — F17.200 NICOTINE DEPENDENCE: Status: ACTIVE | Noted: 2024-08-05

## 2024-08-06 ENCOUNTER — APPOINTMENT (OUTPATIENT)
Dept: PRIMARY CARE | Facility: CLINIC | Age: 74
End: 2024-08-06
Payer: MEDICARE

## 2024-08-06 PROBLEM — F32.4 MAJOR DEPRESSIVE DISORDER, SINGLE EPISODE, IN PARTIAL REMISSION (CMS-HCC): Status: ACTIVE | Noted: 2024-08-06

## 2024-08-16 ENCOUNTER — HOSPITAL ENCOUNTER (OUTPATIENT)
Dept: RADIOLOGY | Facility: CLINIC | Age: 74
Discharge: HOME | End: 2024-08-16
Payer: MEDICARE

## 2024-08-16 DIAGNOSIS — Z12.2 ENCOUNTER FOR SCREENING FOR LUNG CANCER: ICD-10-CM

## 2024-08-16 DIAGNOSIS — F17.210 TOBACCO DEPENDENCE DUE TO CIGARETTES: ICD-10-CM

## 2024-08-16 PROCEDURE — 71271 CT THORAX LUNG CANCER SCR C-: CPT

## 2024-08-20 ENCOUNTER — APPOINTMENT (OUTPATIENT)
Dept: UROLOGY | Facility: CLINIC | Age: 74
End: 2024-08-20
Payer: MEDICARE

## 2024-08-20 VITALS — BODY MASS INDEX: 30.15 KG/M2 | HEIGHT: 64 IN | WEIGHT: 176.6 LBS

## 2024-08-20 DIAGNOSIS — N32.81 OAB (OVERACTIVE BLADDER): Primary | ICD-10-CM

## 2024-08-20 DIAGNOSIS — R15.9 INCONTINENCE OF FECES, UNSPECIFIED FECAL INCONTINENCE TYPE: ICD-10-CM

## 2024-08-20 DIAGNOSIS — N39.3 SUI (STRESS URINARY INCONTINENCE, FEMALE): ICD-10-CM

## 2024-08-20 PROCEDURE — 3008F BODY MASS INDEX DOCD: CPT | Performed by: STUDENT IN AN ORGANIZED HEALTH CARE EDUCATION/TRAINING PROGRAM

## 2024-08-20 PROCEDURE — 99214 OFFICE O/P EST MOD 30 MIN: CPT | Performed by: STUDENT IN AN ORGANIZED HEALTH CARE EDUCATION/TRAINING PROGRAM

## 2024-08-20 PROCEDURE — 1123F ACP DISCUSS/DSCN MKR DOCD: CPT | Performed by: STUDENT IN AN ORGANIZED HEALTH CARE EDUCATION/TRAINING PROGRAM

## 2024-08-20 PROCEDURE — 1159F MED LIST DOCD IN RCRD: CPT | Performed by: STUDENT IN AN ORGANIZED HEALTH CARE EDUCATION/TRAINING PROGRAM

## 2024-08-20 RX ORDER — VIBEGRON 75 MG/1
75 TABLET, FILM COATED ORAL DAILY
Qty: 90 TABLET | Refills: 3 | Status: SHIPPED | OUTPATIENT
Start: 2024-08-20 | End: 2025-08-15

## 2024-08-20 NOTE — PROGRESS NOTES
HISTORY OF PRESENT ILLNESS:  Viki Kumar is a 74 y.o. female who presents today for a follow up visit. Doing well with gemtesa.          Past Medical History  She has a past medical history of Actinic keratosis (02/28/2017), Arthralgia (01/20/2023), Bipolar disorder, unspecified (Multi), Chronic obstructive pulmonary disease (Multi) (01/20/2023), COPD (chronic obstructive pulmonary disease) with acute bronchitis (Multi) (04/04/2023), Delayed emergence from general anesthesia, Diarrhea, Fecal incontinence (01/20/2023), Hemoptysis (04/12/2023), Hyperlipidemia, Lump of skin (04/12/2023), Other seborrheic keratosis (02/28/2017), Personal history of other diseases of the digestive system, Personal history of other mental and behavioral disorders, Scar condition and fibrosis of skin (02/28/2017), Shortness of breath (03/14/2023), Shortness of breath at rest (02/22/2017), Skin changes due to chronic exposure to nonionizing radiation, unspecified (02/28/2017), and Wheezing (02/22/2017).    Surgical History  She has a past surgical history that includes Other surgical history (12/28/2018); Other surgical history (12/28/2018); Other surgical history (12/28/2018); and Other surgical history (12/31/2018).     Social History  She reports that she quit smoking about 22 months ago. Her smoking use included cigarettes. She started smoking about 51 years ago. She has a 50 pack-year smoking history. She has been exposed to tobacco smoke. She has never used smokeless tobacco. She reports that she does not currently use alcohol. She reports that she does not use drugs.    Family History  Family History   Problem Relation Name Age of Onset    Other (SMALL CELL CARCINOMA) Mother      Emphysema Mother      Emphysema Father      Heart failure Father      Emphysema Brother Mayo     Lung cancer Brother Mayo     Coronary artery disease Brother Mayo     Other (cardiac bypass) Brother Mayo     Other (shoulder replacement) Brother Timoteo  "    Gout Brother Timoteo     Memory loss Brother Manjit     Gout Brother Manjit     Accidental death Brother Amilcar         Allergies  Patient has no known allergies.      A comprehensive 10+ review of systems was negative except for: see hpi                          PHYSICAL EXAMINATION:  BP Readings from Last 3 Encounters:   07/09/24 136/80   06/13/24 141/80   06/06/24 138/78      Wt Readings from Last 3 Encounters:   08/20/24 80.1 kg (176 lb 9.6 oz)   07/09/24 79.4 kg (175 lb)   06/20/24 79.4 kg (175 lb)      BMI: Estimated body mass index is 30.31 kg/m² as calculated from the following:    Height as of this encounter: 1.626 m (5' 4\").    Weight as of this encounter: 80.1 kg (176 lb 9.6 oz).  BSA: Estimated body surface area is 1.9 meters squared as calculated from the following:    Height as of this encounter: 1.626 m (5' 4\").    Weight as of this encounter: 80.1 kg (176 lb 9.6 oz).  HEENT: Normocephalic, atraumatic, PER EOMI, nonicteric, trachea normal, thyroid normal, oropharynx normal.  CARDIAC: regular rate & rhythm, S1 & S2 normal.  No heaves, thrills, gallops or murmurs.  LUNGS: Clear to auscultation, no spinal or CV tenderness.  EXTREMITIES: No evidence of cyanosis, clubbing or edema.               Assessment:    Viki Kumar is a 74 y.o. who presents with urgency incontinence, fecal incontinence mixed incontinence with positive CST and fecal incontinence           SHIELA         Positive CST, status post sling 5/17/2024     Stress incontinence improved    Urgency improved with gemtesa, previously had failed Myrbetriq      Rx gemtesa  Referral to  clinical pharmacy          Fecal incontinence:  Currently this is well-controlled off meds     Follow up in 3 months        All questions and concerns were answered and addressed.  The patient expressed understanding and agrees with the plan.     Manjit Patton MD    Scribe Attestation  By signing my name below, ILily, Scrernie   attest that this " documentation has been prepared under the direction and in the presence of Manjit Patton MD.

## 2024-10-04 ENCOUNTER — APPOINTMENT (OUTPATIENT)
Dept: PRIMARY CARE | Facility: CLINIC | Age: 74
End: 2024-10-04
Payer: MEDICARE

## 2024-10-04 VITALS
DIASTOLIC BLOOD PRESSURE: 76 MMHG | OXYGEN SATURATION: 97 % | HEART RATE: 75 BPM | RESPIRATION RATE: 16 BRPM | SYSTOLIC BLOOD PRESSURE: 112 MMHG | WEIGHT: 171.3 LBS | HEIGHT: 64 IN | TEMPERATURE: 97.3 F | BODY MASS INDEX: 29.24 KG/M2

## 2024-10-04 DIAGNOSIS — R73.01 IFG (IMPAIRED FASTING GLUCOSE): ICD-10-CM

## 2024-10-04 DIAGNOSIS — N32.81 OAB (OVERACTIVE BLADDER): ICD-10-CM

## 2024-10-04 DIAGNOSIS — Z13.29 SCREENING FOR THYROID DISORDER: ICD-10-CM

## 2024-10-04 DIAGNOSIS — M81.0 AGE-RELATED OSTEOPOROSIS WITHOUT CURRENT PATHOLOGICAL FRACTURE: Primary | ICD-10-CM

## 2024-10-04 DIAGNOSIS — E78.2 MIXED HYPERLIPIDEMIA: ICD-10-CM

## 2024-10-04 DIAGNOSIS — F41.1 GENERALIZED ANXIETY DISORDER: ICD-10-CM

## 2024-10-04 PROBLEM — R06.02 SHORTNESS OF BREATH AT REST: Status: RESOLVED | Noted: 2024-08-05 | Resolved: 2024-10-04

## 2024-10-04 PROBLEM — F31.9 BIPOLAR DISORDER: Status: RESOLVED | Noted: 2023-05-10 | Resolved: 2024-10-04

## 2024-10-04 PROBLEM — M62.81 MUSCLE WEAKNESS OF EXTREMITY: Status: RESOLVED | Noted: 2024-08-05 | Resolved: 2024-10-04

## 2024-10-04 PROBLEM — R04.2 HEMOPTYSIS: Status: RESOLVED | Noted: 2024-08-05 | Resolved: 2024-10-04

## 2024-10-04 PROBLEM — M62.81 MUSCLE WEAKNESS: Status: RESOLVED | Noted: 2024-08-05 | Resolved: 2024-10-04

## 2024-10-04 PROBLEM — G83.10 PARESIS OF SINGLE LOWER EXTREMITY (MULTI): Status: RESOLVED | Noted: 2024-08-05 | Resolved: 2024-10-04

## 2024-10-04 PROBLEM — R29.898 LEG WEAKNESS: Status: RESOLVED | Noted: 2023-01-20 | Resolved: 2024-10-04

## 2024-10-04 PROBLEM — M25.50 ARTHRALGIA: Status: RESOLVED | Noted: 2023-01-20 | Resolved: 2024-10-04

## 2024-10-04 PROBLEM — R05.9 COUGH, UNSPECIFIED: Status: RESOLVED | Noted: 2023-03-14 | Resolved: 2024-10-04

## 2024-10-04 PROBLEM — N30.00 ACUTE CYSTITIS: Status: RESOLVED | Noted: 2024-08-05 | Resolved: 2024-10-04

## 2024-10-04 PROCEDURE — 3008F BODY MASS INDEX DOCD: CPT

## 2024-10-04 PROCEDURE — 1036F TOBACCO NON-USER: CPT

## 2024-10-04 PROCEDURE — 99214 OFFICE O/P EST MOD 30 MIN: CPT

## 2024-10-04 PROCEDURE — 1126F AMNT PAIN NOTED NONE PRSNT: CPT

## 2024-10-04 PROCEDURE — 1159F MED LIST DOCD IN RCRD: CPT

## 2024-10-04 PROCEDURE — 1158F ADVNC CARE PLAN TLK DOCD: CPT

## 2024-10-04 PROCEDURE — 1123F ACP DISCUSS/DSCN MKR DOCD: CPT

## 2024-10-04 RX ORDER — DIPHENHYDRAMINE HYDROCHLORIDE 50 MG/ML
50 INJECTION INTRAMUSCULAR; INTRAVENOUS AS NEEDED
OUTPATIENT
Start: 2024-10-09

## 2024-10-04 RX ORDER — SERTRALINE HYDROCHLORIDE 100 MG/1
150 TABLET, FILM COATED ORAL DAILY
Qty: 135 TABLET | Refills: 3 | Status: SHIPPED | OUTPATIENT
Start: 2024-10-04 | End: 2025-10-04

## 2024-10-04 RX ORDER — EPINEPHRINE 0.3 MG/.3ML
0.3 INJECTION SUBCUTANEOUS EVERY 5 MIN PRN
OUTPATIENT
Start: 2024-10-09

## 2024-10-04 RX ORDER — FAMOTIDINE 10 MG/ML
20 INJECTION INTRAVENOUS ONCE AS NEEDED
OUTPATIENT
Start: 2024-10-09

## 2024-10-04 RX ORDER — ROSUVASTATIN CALCIUM 10 MG/1
10 TABLET, COATED ORAL DAILY
Qty: 90 TABLET | Refills: 3 | Status: SHIPPED | OUTPATIENT
Start: 2024-10-04 | End: 2025-10-04

## 2024-10-04 RX ORDER — ALBUTEROL SULFATE 0.83 MG/ML
3 SOLUTION RESPIRATORY (INHALATION) AS NEEDED
OUTPATIENT
Start: 2024-10-09

## 2024-10-04 RX ORDER — VIBEGRON 75 MG/1
75 TABLET, FILM COATED ORAL DAILY
Qty: 90 TABLET | Refills: 3 | Status: CANCELLED | OUTPATIENT
Start: 2024-10-04 | End: 2025-09-29

## 2024-10-04 SDOH — ECONOMIC STABILITY: FOOD INSECURITY: WITHIN THE PAST 12 MONTHS, THE FOOD YOU BOUGHT JUST DIDN'T LAST AND YOU DIDN'T HAVE MONEY TO GET MORE.: NEVER TRUE

## 2024-10-04 SDOH — ECONOMIC STABILITY: FOOD INSECURITY: WITHIN THE PAST 12 MONTHS, YOU WORRIED THAT YOUR FOOD WOULD RUN OUT BEFORE YOU GOT MONEY TO BUY MORE.: NEVER TRUE

## 2024-10-04 ASSESSMENT — ENCOUNTER SYMPTOMS
CARDIOVASCULAR NEGATIVE: 1
ENDOCRINE NEGATIVE: 1
NEUROLOGICAL NEGATIVE: 1
PSYCHIATRIC NEGATIVE: 1
OCCASIONAL FEELINGS OF UNSTEADINESS: 0
HEMATOLOGIC/LYMPHATIC NEGATIVE: 1
DEPRESSION: 0
EYES NEGATIVE: 1
RESPIRATORY NEGATIVE: 1
LOSS OF SENSATION IN FEET: 0
GASTROINTESTINAL NEGATIVE: 1
CONSTITUTIONAL NEGATIVE: 1
MUSCULOSKELETAL NEGATIVE: 1

## 2024-10-04 ASSESSMENT — ANXIETY QUESTIONNAIRES
IF YOU CHECKED OFF ANY PROBLEMS ON THIS QUESTIONNAIRE, HOW DIFFICULT HAVE THESE PROBLEMS MADE IT FOR YOU TO DO YOUR WORK, TAKE CARE OF THINGS AT HOME, OR GET ALONG WITH OTHER PEOPLE: SOMEWHAT DIFFICULT
GAD7 TOTAL SCORE: 9
7. FEELING AFRAID AS IF SOMETHING AWFUL MIGHT HAPPEN: NOT AT ALL
2. NOT BEING ABLE TO STOP OR CONTROL WORRYING: NOT AT ALL
1. FEELING NERVOUS, ANXIOUS, OR ON EDGE: MORE THAN HALF THE DAYS
4. TROUBLE RELAXING: NEARLY EVERY DAY
6. BECOMING EASILY ANNOYED OR IRRITABLE: MORE THAN HALF THE DAYS
3. WORRYING TOO MUCH ABOUT DIFFERENT THINGS: NOT AT ALL
5. BEING SO RESTLESS THAT IT IS HARD TO SIT STILL: MORE THAN HALF THE DAYS

## 2024-10-04 ASSESSMENT — PAIN SCALES - GENERAL: PAINLEVEL: 0-NO PAIN

## 2024-10-04 ASSESSMENT — LIFESTYLE VARIABLES
HOW OFTEN DO YOU HAVE A DRINK CONTAINING ALCOHOL: NEVER
SKIP TO QUESTIONS 9-10: 1
AUDIT-C TOTAL SCORE: 0
HOW OFTEN DO YOU HAVE SIX OR MORE DRINKS ON ONE OCCASION: NEVER
HOW MANY STANDARD DRINKS CONTAINING ALCOHOL DO YOU HAVE ON A TYPICAL DAY: PATIENT DOES NOT DRINK

## 2024-10-04 NOTE — PROGRESS NOTES
Subjective   Patient ID: Viki Kumar is a 74 y.o. female who presents for 6 month follow up of anxiety and osteoporosis.    Diet: Eating a good amount of veggies, fruits and protein; Drinking 1 cup of tumeric and ginger tea per day  Exercise: No regular exercise due to back pain and shortness of breath   Weight: Stable  Water: Drinking 6-8 bottles per day   Sleep: sleep in better taking Melatonin- usually getting about 7 hours; has to urinate overnight; napping during the day   Social: , son lives next door. Daughter moved to [place].   Professional: Retired /      Review of Systems   Constitutional: Negative.    HENT: Negative.     Eyes: Negative.    Respiratory: Negative.     Cardiovascular: Negative.    Gastrointestinal: Negative.    Endocrine: Negative.    Genitourinary: Negative.    Musculoskeletal: Negative.    Skin: Negative.    Neurological: Negative.    Hematological: Negative.    Psychiatric/Behavioral: Negative.          Current Outpatient Medications   Medication Sig Dispense Refill    acetaminophen (Tylenol) 500 mg tablet Take 2 tablets (1,000 mg) by mouth every 6 hours if needed for mild pain (1 - 3). 30 tablet 0    albuterol 2.5 mg /3 mL (0.083 %) nebulizer solution Take 3 mL (2.5 mg) by nebulization 4 times a day as needed for wheezing or shortness of breath. 100 mL 11    albuterol 90 mcg/actuation inhaler Inhale 1 puff every 4 hours if needed for wheezing or shortness of breath. 18 g 11    ascorbic acid (VITAMIN C ORAL) Take by mouth.      biotin 1 mg capsule Take by mouth.      CALCIUM CITRATE ORAL Take by mouth.      fluticasone propion-salmeteroL (Wixela Inhub) 500-50 mcg/dose diskus inhaler Inhale 1 puff 2 times a day. Rinse mouth with water after use to reduce aftertaste and incidence of candidiasis. Do not swallow. 60 each 11    turmeric 400 mg capsule Take by mouth.      VITAMIN B COMPLEX ORAL Take by mouth.      ZINC ORAL Take 100 mg by mouth.       "docosahexaenoic acid/epa (FISH OIL ORAL) Take by mouth.      ibuprofen 200 mg tablet Take 1 tablet (200 mg) by mouth every 2 hours if needed.      MAGNESIUM ORAL Take 200 mg by mouth.      MILK THISTLE ORAL Take 1,000 mg by mouth.      rosuvastatin (Crestor) 10 mg tablet Take 1 tablet (10 mg) by mouth once daily. 90 tablet 3    sertraline (Zoloft) 100 mg tablet Take 1.5 tablets (150 mg) by mouth once daily. 135 tablet 3     No current facility-administered medications for this visit.     Past Surgical History:   Procedure Laterality Date    OTHER SURGICAL HISTORY  2018    Hernia repair    OTHER SURGICAL HISTORY  2018    Tonsillectomy    OTHER SURGICAL HISTORY  2018    Hip surgery    OTHER SURGICAL HISTORY  2018    Bone transplantation autograft     Family History   Problem Relation Name Age of Onset    Other (SMALL CELL CARCINOMA) Mother      Emphysema Mother      Emphysema Father      Heart failure Father      Emphysema Brother Mayo     Lung cancer Brother Mayo     Coronary artery disease Brother Mayo     Other (cardiac bypass) Brother Mayo     Other (shoulder replacement) Brother Timoteo     Gout Brother Timoteo     Memory loss Brother Manjit     Gout Brother Manjit     Accidental death Brother Amilcar       Social History     Tobacco Use    Smoking status: Former     Current packs/day: 0.00     Average packs/day: 1 pack/day for 50.0 years (50.0 ttl pk-yrs)     Types: Cigarettes     Start date: 10/1/1972     Quit date: 10/1/2022     Years since quittin.0     Passive exposure: Past    Smokeless tobacco: Never   Vaping Use    Vaping status: Former   Substance Use Topics    Alcohol use: Not Currently     Comment: occassionally    Drug use: Never     Comment: CBD gummies        Objective     Visit Vitals  /76 (BP Location: Left arm, Patient Position: Sitting, BP Cuff Size: Adult)   Pulse 75   Temp 36.3 °C (97.3 °F) (Temporal)   Resp 16   Ht 1.626 m (5' 4\")   Wt 77.7 kg (171 lb 4.8 oz)   SpO2 " 97%   BMI 29.40 kg/m²   OB Status Postmenopausal   Smoking Status Former   BSA 1.87 m²        Physical Exam  Constitutional:       Appearance: Normal appearance.   HENT:      Head: Normocephalic and atraumatic.   Eyes:      Extraocular Movements: Extraocular movements intact.      Pupils: Pupils are equal, round, and reactive to light.   Cardiovascular:      Rate and Rhythm: Normal rate and regular rhythm.   Pulmonary:      Effort: Pulmonary effort is normal.      Breath sounds: Normal breath sounds.   Abdominal:      General: Abdomen is flat. Bowel sounds are normal.      Palpations: Abdomen is soft.   Musculoskeletal:         General: Normal range of motion.   Skin:     General: Skin is warm and dry.      Capillary Refill: Capillary refill takes less than 2 seconds.   Neurological:      General: No focal deficit present.      Mental Status: She is alert and oriented to person, place, and time.   Psychiatric:         Mood and Affect: Mood normal.         Behavior: Behavior normal.           Assessment/Plan   Problem List Items Addressed This Visit       Generalized anxiety disorder     Started on busprione for acute anxiety at last visit, didn't take it  Using as needed benadryl which she feels helps her with acute symptoms    Continue sertraline 150mg daily         Relevant Medications    sertraline (Zoloft) 100 mg tablet    Hyperlipidemia    Relevant Medications    rosuvastatin (Crestor) 10 mg tablet    Other Relevant Orders    CBC    Lipid Panel    Comprehensive Metabolic Panel    IFG (impaired fasting glucose)    Relevant Orders    Hemoglobin A1c    Age-related osteoporosis without current pathological fracture - Primary     DEXA scan from 7/2023 with osteoporosis  Has not been started on Prolia treatment  R/B/A discussed with her today, agreeable to trial of Prolia    Therapy plan for Prolia injection placed today  Repeat DEXA in 10/2026         Relevant Orders    Vitamin D 25-Hydroxy,Total (for eval of  Vitamin D levels)     Other Visit Diagnoses       OAB (overactive bladder)        Screening for thyroid disorder        Relevant Orders    TSH with reflex to Free T4 if abnormal            All pertinent lab work and results were reviewed with patient.     Follow up with me in 6 months or sooner as needed    Jeanna WILLIAMSON-CNS

## 2024-10-04 NOTE — ASSESSMENT & PLAN NOTE
Started on busprione for acute anxiety at last visit, didn't take it  Using as needed benadryl which she feels helps her with acute symptoms    Continue sertraline 150mg daily

## 2024-10-04 NOTE — ASSESSMENT & PLAN NOTE
DEXA scan from 7/2023 with osteoporosis  Has not been started on Prolia treatment  R/B/A discussed with her today, agreeable to trial of Prolia    Therapy plan for Prolia injection placed today  Repeat DEXA in 10/2026

## 2024-10-04 NOTE — PATIENT INSTRUCTIONS
Thank you for coming to see me today.  If you have any questions or concerns following our visit, please contact the office.  Phone: [phone]    Follow up with me in 3 months or sooner as needed    1)  Get fasting labwork a few days prior to next visit.  The lab is down the [place] from our office.     2) I am referring you to the infusion center for Prolia injections to treat osteoporosis. They will call you in the next several days to schedule appointment

## 2024-10-16 ENCOUNTER — LAB (OUTPATIENT)
Dept: LAB | Facility: LAB | Age: 74
End: 2024-10-16
Payer: MEDICARE

## 2024-10-16 DIAGNOSIS — M81.0 AGE-RELATED OSTEOPOROSIS WITHOUT CURRENT PATHOLOGICAL FRACTURE: ICD-10-CM

## 2024-10-16 DIAGNOSIS — R73.01 IFG (IMPAIRED FASTING GLUCOSE): ICD-10-CM

## 2024-10-16 DIAGNOSIS — Z13.29 SCREENING FOR THYROID DISORDER: ICD-10-CM

## 2024-10-16 DIAGNOSIS — E78.2 MIXED HYPERLIPIDEMIA: ICD-10-CM

## 2024-10-16 LAB
25(OH)D3 SERPL-MCNC: 34 NG/ML (ref 30–100)
ALBUMIN SERPL BCP-MCNC: 4.7 G/DL (ref 3.4–5)
ALP SERPL-CCNC: 75 U/L (ref 33–136)
ALT SERPL W P-5'-P-CCNC: 11 U/L (ref 7–45)
ANION GAP SERPL CALC-SCNC: 13 MMOL/L (ref 10–20)
AST SERPL W P-5'-P-CCNC: 12 U/L (ref 9–39)
BILIRUB SERPL-MCNC: 0.7 MG/DL (ref 0–1.2)
BUN SERPL-MCNC: 18 MG/DL (ref 6–23)
CALCIUM SERPL-MCNC: 9.7 MG/DL (ref 8.6–10.3)
CHLORIDE SERPL-SCNC: 108 MMOL/L (ref 98–107)
CHOLEST SERPL-MCNC: 126 MG/DL (ref 0–199)
CHOLESTEROL/HDL RATIO: 2.9
CO2 SERPL-SCNC: 24 MMOL/L (ref 21–32)
CREAT SERPL-MCNC: 0.77 MG/DL (ref 0.5–1.05)
EGFRCR SERPLBLD CKD-EPI 2021: 82 ML/MIN/1.73M*2
ERYTHROCYTE [DISTWIDTH] IN BLOOD BY AUTOMATED COUNT: 13.9 % (ref 11.5–14.5)
EST. AVERAGE GLUCOSE BLD GHB EST-MCNC: 120 MG/DL
GLUCOSE SERPL-MCNC: 104 MG/DL (ref 74–99)
HBA1C MFR BLD: 5.8 %
HCT VFR BLD AUTO: 43.2 % (ref 36–46)
HDLC SERPL-MCNC: 43.6 MG/DL
HGB BLD-MCNC: 14 G/DL (ref 12–16)
LDLC SERPL CALC-MCNC: 57 MG/DL
MCH RBC QN AUTO: 27.3 PG (ref 26–34)
MCHC RBC AUTO-ENTMCNC: 32.4 G/DL (ref 32–36)
MCV RBC AUTO: 84 FL (ref 80–100)
NON HDL CHOLESTEROL: 82 MG/DL (ref 0–149)
NRBC BLD-RTO: 0 /100 WBCS (ref 0–0)
PLATELET # BLD AUTO: 214 X10*3/UL (ref 150–450)
POTASSIUM SERPL-SCNC: 4 MMOL/L (ref 3.5–5.3)
PROT SERPL-MCNC: 7.3 G/DL (ref 6.4–8.2)
RBC # BLD AUTO: 5.12 X10*6/UL (ref 4–5.2)
SODIUM SERPL-SCNC: 141 MMOL/L (ref 136–145)
TRIGL SERPL-MCNC: 127 MG/DL (ref 0–149)
TSH SERPL-ACNC: 0.47 MIU/L (ref 0.44–3.98)
VLDL: 25 MG/DL (ref 0–40)
WBC # BLD AUTO: 8.3 X10*3/UL (ref 4.4–11.3)

## 2024-10-16 PROCEDURE — 36415 COLL VENOUS BLD VENIPUNCTURE: CPT

## 2024-10-16 PROCEDURE — 80061 LIPID PANEL: CPT

## 2024-10-16 PROCEDURE — 82306 VITAMIN D 25 HYDROXY: CPT

## 2024-10-16 PROCEDURE — 83036 HEMOGLOBIN GLYCOSYLATED A1C: CPT

## 2024-10-16 PROCEDURE — 84443 ASSAY THYROID STIM HORMONE: CPT

## 2024-10-16 PROCEDURE — 80053 COMPREHEN METABOLIC PANEL: CPT

## 2024-10-16 PROCEDURE — 85027 COMPLETE CBC AUTOMATED: CPT

## 2024-10-19 DIAGNOSIS — R73.03 PREDIABETES: Primary | ICD-10-CM

## 2024-10-21 ENCOUNTER — APPOINTMENT (OUTPATIENT)
Dept: INFUSION THERAPY | Facility: HOSPITAL | Age: 74
End: 2024-10-21
Payer: MEDICARE

## 2024-10-21 VITALS
SYSTOLIC BLOOD PRESSURE: 150 MMHG | DIASTOLIC BLOOD PRESSURE: 100 MMHG | RESPIRATION RATE: 16 BRPM | TEMPERATURE: 97.7 F | OXYGEN SATURATION: 96 % | HEART RATE: 75 BPM

## 2024-10-21 DIAGNOSIS — M81.0 AGE-RELATED OSTEOPOROSIS WITHOUT CURRENT PATHOLOGICAL FRACTURE: ICD-10-CM

## 2024-10-21 PROCEDURE — 2500000004 HC RX 250 GENERAL PHARMACY W/ HCPCS (ALT 636 FOR OP/ED)

## 2024-10-21 PROCEDURE — 96372 THER/PROPH/DIAG INJ SC/IM: CPT

## 2024-10-21 RX ORDER — FAMOTIDINE 10 MG/ML
20 INJECTION INTRAVENOUS ONCE AS NEEDED
OUTPATIENT
Start: 2025-04-19

## 2024-10-21 RX ORDER — DIPHENHYDRAMINE HYDROCHLORIDE 50 MG/ML
50 INJECTION INTRAMUSCULAR; INTRAVENOUS AS NEEDED
OUTPATIENT
Start: 2025-04-19

## 2024-10-21 RX ORDER — LANOLIN ALCOHOL/MO/W.PET/CERES
1000 CREAM (GRAM) TOPICAL DAILY
COMMUNITY

## 2024-10-21 RX ORDER — DENOSUMAB 60 MG/ML
60 INJECTION SUBCUTANEOUS
COMMUNITY

## 2024-10-21 RX ORDER — UBIQUINOL 100 MG
1 CAPSULE ORAL DAILY
COMMUNITY

## 2024-10-21 RX ORDER — VIBEGRON 75 MG/1
75 TABLET, FILM COATED ORAL DAILY
COMMUNITY

## 2024-10-21 RX ORDER — EPINEPHRINE 0.3 MG/.3ML
0.3 INJECTION SUBCUTANEOUS EVERY 5 MIN PRN
OUTPATIENT
Start: 2025-04-19

## 2024-10-21 RX ORDER — ALBUTEROL SULFATE 0.83 MG/ML
3 SOLUTION RESPIRATORY (INHALATION) AS NEEDED
OUTPATIENT
Start: 2025-04-19

## 2024-10-21 ASSESSMENT — PAIN SCALES - GENERAL: PAINLEVEL_OUTOF10: 0-NO PAIN

## 2024-10-21 ASSESSMENT — ENCOUNTER SYMPTOMS
OCCASIONAL FEELINGS OF UNSTEADINESS: 1
LOSS OF SENSATION IN FEET: 0
DEPRESSION: 0

## 2024-10-25 DIAGNOSIS — J43.9 PULMONARY EMPHYSEMA, UNSPECIFIED EMPHYSEMA TYPE (MULTI): ICD-10-CM

## 2024-10-25 RX ORDER — ALBUTEROL SULFATE 0.83 MG/ML
2.5 SOLUTION RESPIRATORY (INHALATION) 4 TIMES DAILY PRN
Qty: 100 ML | Refills: 11 | Status: SHIPPED | OUTPATIENT
Start: 2024-10-25 | End: 2025-10-25

## 2024-11-18 ENCOUNTER — TELEPHONE (OUTPATIENT)
Dept: PRIMARY CARE | Facility: CLINIC | Age: 74
End: 2024-11-18
Payer: MEDICARE

## 2024-11-18 NOTE — TELEPHONE ENCOUNTER
Patient called in stating that she cannot afford the Prolia infusion that you had ordered for her. It is 300$. She wants to know she can stop it? Or is there something else she can do?    Please advise

## 2024-11-26 ENCOUNTER — APPOINTMENT (OUTPATIENT)
Dept: UROLOGY | Facility: CLINIC | Age: 74
End: 2024-11-26
Payer: MEDICARE

## 2024-12-03 ENCOUNTER — LAB (OUTPATIENT)
Dept: LAB | Facility: LAB | Age: 74
End: 2024-12-03
Payer: MEDICARE

## 2024-12-03 ENCOUNTER — APPOINTMENT (OUTPATIENT)
Facility: HOSPITAL | Age: 74
End: 2024-12-03
Payer: MEDICARE

## 2024-12-21 ENCOUNTER — APPOINTMENT (OUTPATIENT)
Dept: RADIOLOGY | Facility: HOSPITAL | Age: 74
End: 2024-12-21
Payer: MEDICARE

## 2024-12-21 ENCOUNTER — HOSPITAL ENCOUNTER (EMERGENCY)
Facility: HOSPITAL | Age: 74
Discharge: HOME | End: 2024-12-21
Payer: MEDICARE

## 2024-12-21 VITALS
SYSTOLIC BLOOD PRESSURE: 139 MMHG | BODY MASS INDEX: 30.9 KG/M2 | HEART RATE: 76 BPM | HEIGHT: 64 IN | WEIGHT: 181 LBS | DIASTOLIC BLOOD PRESSURE: 74 MMHG | TEMPERATURE: 98.5 F | RESPIRATION RATE: 18 BRPM | OXYGEN SATURATION: 98 %

## 2024-12-21 DIAGNOSIS — R10.9 FLANK PAIN: Primary | ICD-10-CM

## 2024-12-21 LAB
ANION GAP SERPL CALC-SCNC: 11 MMOL/L (ref 10–20)
APPEARANCE UR: ABNORMAL
BACTERIA #/AREA URNS AUTO: ABNORMAL /HPF
BASOPHILS # BLD AUTO: 0.05 X10*3/UL (ref 0–0.1)
BASOPHILS NFR BLD AUTO: 0.5 %
BILIRUB UR STRIP.AUTO-MCNC: NEGATIVE MG/DL
BUN SERPL-MCNC: 25 MG/DL (ref 6–23)
CALCIUM SERPL-MCNC: 9.6 MG/DL (ref 8.6–10.3)
CHLORIDE SERPL-SCNC: 112 MMOL/L (ref 98–107)
CO2 SERPL-SCNC: 21 MMOL/L (ref 21–32)
COLOR UR: YELLOW
CREAT SERPL-MCNC: 0.68 MG/DL (ref 0.5–1.05)
EGFRCR SERPLBLD CKD-EPI 2021: >90 ML/MIN/1.73M*2
EOSINOPHIL # BLD AUTO: 0.15 X10*3/UL (ref 0–0.4)
EOSINOPHIL NFR BLD AUTO: 1.4 %
ERYTHROCYTE [DISTWIDTH] IN BLOOD BY AUTOMATED COUNT: 13.6 % (ref 11.5–14.5)
GLUCOSE SERPL-MCNC: 102 MG/DL (ref 74–99)
GLUCOSE UR STRIP.AUTO-MCNC: NORMAL MG/DL
HCT VFR BLD AUTO: 44.7 % (ref 36–46)
HGB BLD-MCNC: 14.7 G/DL (ref 12–16)
HYALINE CASTS #/AREA URNS AUTO: ABNORMAL /LPF
IMM GRANULOCYTES # BLD AUTO: 0.06 X10*3/UL (ref 0–0.5)
IMM GRANULOCYTES NFR BLD AUTO: 0.6 % (ref 0–0.9)
KETONES UR STRIP.AUTO-MCNC: NEGATIVE MG/DL
LACTATE SERPL-SCNC: 0.9 MMOL/L (ref 0.4–2)
LEUKOCYTE ESTERASE UR QL STRIP.AUTO: ABNORMAL
LYMPHOCYTES # BLD AUTO: 2.01 X10*3/UL (ref 0.8–3)
LYMPHOCYTES NFR BLD AUTO: 19.3 %
MCH RBC QN AUTO: 27.4 PG (ref 26–34)
MCHC RBC AUTO-ENTMCNC: 32.9 G/DL (ref 32–36)
MCV RBC AUTO: 83 FL (ref 80–100)
MONOCYTES # BLD AUTO: 0.58 X10*3/UL (ref 0.05–0.8)
MONOCYTES NFR BLD AUTO: 5.6 %
MUCOUS THREADS #/AREA URNS AUTO: ABNORMAL /LPF
NEUTROPHILS # BLD AUTO: 7.55 X10*3/UL (ref 1.6–5.5)
NEUTROPHILS NFR BLD AUTO: 72.6 %
NITRITE UR QL STRIP.AUTO: NEGATIVE
NRBC BLD-RTO: 0 /100 WBCS (ref 0–0)
PH UR STRIP.AUTO: 5.5 [PH]
PLATELET # BLD AUTO: 228 X10*3/UL (ref 150–450)
POTASSIUM SERPL-SCNC: 4.1 MMOL/L (ref 3.5–5.3)
PROT UR STRIP.AUTO-MCNC: ABNORMAL MG/DL
RBC # BLD AUTO: 5.36 X10*6/UL (ref 4–5.2)
RBC # UR STRIP.AUTO: NEGATIVE /UL
RBC #/AREA URNS AUTO: ABNORMAL /HPF
SODIUM SERPL-SCNC: 140 MMOL/L (ref 136–145)
SP GR UR STRIP.AUTO: 1.03
SQUAMOUS #/AREA URNS AUTO: ABNORMAL /HPF
UROBILINOGEN UR STRIP.AUTO-MCNC: NORMAL MG/DL
WBC # BLD AUTO: 10.4 X10*3/UL (ref 4.4–11.3)
WBC #/AREA URNS AUTO: >50 /HPF

## 2024-12-21 PROCEDURE — 2500000004 HC RX 250 GENERAL PHARMACY W/ HCPCS (ALT 636 FOR OP/ED): Performed by: NURSE PRACTITIONER

## 2024-12-21 PROCEDURE — 87086 URINE CULTURE/COLONY COUNT: CPT | Mod: PORLAB | Performed by: NURSE PRACTITIONER

## 2024-12-21 PROCEDURE — 83605 ASSAY OF LACTIC ACID: CPT | Performed by: NURSE PRACTITIONER

## 2024-12-21 PROCEDURE — 99284 EMERGENCY DEPT VISIT MOD MDM: CPT | Mod: 25

## 2024-12-21 PROCEDURE — 80048 BASIC METABOLIC PNL TOTAL CA: CPT | Performed by: NURSE PRACTITIONER

## 2024-12-21 PROCEDURE — 74176 CT ABD & PELVIS W/O CONTRAST: CPT | Performed by: STUDENT IN AN ORGANIZED HEALTH CARE EDUCATION/TRAINING PROGRAM

## 2024-12-21 PROCEDURE — 36415 COLL VENOUS BLD VENIPUNCTURE: CPT | Performed by: NURSE PRACTITIONER

## 2024-12-21 PROCEDURE — 96374 THER/PROPH/DIAG INJ IV PUSH: CPT | Performed by: NURSE PRACTITIONER

## 2024-12-21 PROCEDURE — 74176 CT ABD & PELVIS W/O CONTRAST: CPT

## 2024-12-21 PROCEDURE — 85025 COMPLETE CBC W/AUTO DIFF WBC: CPT | Performed by: NURSE PRACTITIONER

## 2024-12-21 PROCEDURE — 81001 URINALYSIS AUTO W/SCOPE: CPT | Performed by: NURSE PRACTITIONER

## 2024-12-21 RX ORDER — CYCLOBENZAPRINE HCL 10 MG
10 TABLET ORAL 3 TIMES DAILY PRN
Qty: 17 TABLET | Refills: 0 | Status: SHIPPED | OUTPATIENT
Start: 2024-12-21

## 2024-12-21 RX ORDER — KETOROLAC TROMETHAMINE 30 MG/ML
15 INJECTION, SOLUTION INTRAMUSCULAR; INTRAVENOUS ONCE
Status: COMPLETED | OUTPATIENT
Start: 2024-12-21 | End: 2024-12-21

## 2024-12-21 RX ORDER — MELOXICAM 15 MG/1
15 TABLET ORAL DAILY
Qty: 10 TABLET | Refills: 0 | Status: SHIPPED | OUTPATIENT
Start: 2024-12-21

## 2024-12-21 RX ADMIN — KETOROLAC TROMETHAMINE 15 MG: 30 INJECTION, SOLUTION INTRAMUSCULAR at 18:28

## 2024-12-21 ASSESSMENT — LIFESTYLE VARIABLES
EVER HAD A DRINK FIRST THING IN THE MORNING TO STEADY YOUR NERVES TO GET RID OF A HANGOVER: NO
HAVE PEOPLE ANNOYED YOU BY CRITICIZING YOUR DRINKING: NO
HAVE YOU EVER FELT YOU SHOULD CUT DOWN ON YOUR DRINKING: NO
EVER FELT BAD OR GUILTY ABOUT YOUR DRINKING: NO
TOTAL SCORE: 0

## 2024-12-21 ASSESSMENT — PAIN SCALES - GENERAL
PAINLEVEL_OUTOF10: 10 - WORST POSSIBLE PAIN
PAINLEVEL_OUTOF10: 4

## 2024-12-21 ASSESSMENT — COLUMBIA-SUICIDE SEVERITY RATING SCALE - C-SSRS
1. IN THE PAST MONTH, HAVE YOU WISHED YOU WERE DEAD OR WISHED YOU COULD GO TO SLEEP AND NOT WAKE UP?: NO
6. HAVE YOU EVER DONE ANYTHING, STARTED TO DO ANYTHING, OR PREPARED TO DO ANYTHING TO END YOUR LIFE?: NO
2. HAVE YOU ACTUALLY HAD ANY THOUGHTS OF KILLING YOURSELF?: NO

## 2024-12-21 ASSESSMENT — PAIN DESCRIPTION - DESCRIPTORS: DESCRIPTORS: SHARP

## 2024-12-21 ASSESSMENT — PAIN DESCRIPTION - FREQUENCY: FREQUENCY: INTERMITTENT

## 2024-12-21 ASSESSMENT — PAIN - FUNCTIONAL ASSESSMENT: PAIN_FUNCTIONAL_ASSESSMENT: 0-10

## 2024-12-21 ASSESSMENT — PAIN DESCRIPTION - PAIN TYPE: TYPE: ACUTE PAIN

## 2024-12-21 ASSESSMENT — PAIN DESCRIPTION - ORIENTATION: ORIENTATION: RIGHT

## 2024-12-21 NOTE — ED PROVIDER NOTES
Chief Complaint   Patient presents with   • right flank pain/ bloody show on underware x3 days       HPI       74 year old female presents to the Emergency Department today complaining of a 3 week history of intermittent right flank pain that she describes as squeezing in nature, non-radiating, and varies in intensity. Has had hematuria associated with the above. Denies any associated fever, chills, headache, neck pain, chest pain, shortness of breath, abdominal pain, nausea, vomiting, diarrhea, constipation, or urinary symptoms.       History provided by:  Patient             Patient History   Past Medical History:   Diagnosis Date   • Actinic keratosis 02/28/2017   • Arthralgia 01/20/2023   • Bipolar disorder, unspecified (Multi)     Bipolar disease, chronic   • Chronic obstructive pulmonary disease (Multi) 01/20/2023   • COPD (chronic obstructive pulmonary disease) with acute bronchitis (Multi) 04/04/2023   • Delayed emergence from general anesthesia    • Depression    • Diarrhea    • Fecal incontinence 01/20/2023   • Hemoptysis 04/12/2023   • Hyperlipidemia    • Lump of skin 04/12/2023   • Osteoporosis    • Other seborrheic keratosis 02/28/2017   • Personal history of other diseases of the digestive system     History of gastroesophageal reflux (GERD)   • Personal history of other mental and behavioral disorders     History of anxiety   • Scar condition and fibrosis of skin 02/28/2017   • Shortness of breath 03/14/2023   • Shortness of breath at rest 02/22/2017   • Skin changes due to chronic exposure to nonionizing radiation, unspecified 02/28/2017   • Wheezing 02/22/2017     Past Surgical History:   Procedure Laterality Date   • HERNIA REPAIR     • OTHER SURGICAL HISTORY  12/28/2018    Hernia repair   • OTHER SURGICAL HISTORY  12/28/2018    Tonsillectomy   • OTHER SURGICAL HISTORY  12/28/2018    Hip surgery   • OTHER SURGICAL HISTORY  12/31/2018    Bone transplantation autograft   • TONSILLECTOMY       Family  History   Problem Relation Name Age of Onset   • Other (SMALL CELL CARCINOMA) Mother     • Emphysema Mother     • Emphysema Father     • Heart failure Father     • Emphysema Brother Mayo    • Lung cancer Brother Mayo    • Coronary artery disease Brother Mayo    • Other (cardiac bypass) Brother Mayo    • Other (shoulder replacement) Brother Timoteo    • Gout Brother Timoteo    • Memory loss Brother Manjit    • Gout Brother Manjit    • Accidental death Brother Amilcar      Social History     Tobacco Use   • Smoking status: Former     Current packs/day: 0.00     Average packs/day: 1 pack/day for 50.0 years (50.0 ttl pk-yrs)     Types: Cigarettes     Start date: 10/1/1972     Quit date: 10/1/2022     Years since quittin.2     Passive exposure: Past   • Smokeless tobacco: Never   Vaping Use   • Vaping status: Former   Substance Use Topics   • Alcohol use: Yes     Comment: occassionally   • Drug use: Yes     Comment: CBD gummies  Tori tried them           Physical Exam  Constitutional:       Appearance: Normal appearance.   HENT:      Head: Normocephalic.      Right Ear: Tympanic membrane, ear canal and external ear normal.      Left Ear: Tympanic membrane, ear canal and external ear normal.      Nose: Nose normal.      Mouth/Throat:      Mouth: Mucous membranes are moist.      Pharynx: Oropharynx is clear. No oropharyngeal exudate or posterior oropharyngeal erythema.   Eyes:      Conjunctiva/sclera: Conjunctivae normal.      Pupils: Pupils are equal, round, and reactive to light.   Cardiovascular:      Rate and Rhythm: Normal rate and regular rhythm.      Pulses:           Radial pulses are 3+ on the right side and 3+ on the left side.        Dorsalis pedis pulses are 3+ on the right side and 3+ on the left side.      Heart sounds: Normal heart sounds. No murmur heard.     No friction rub. No gallop.   Pulmonary:      Effort: Pulmonary effort is normal. No respiratory distress.      Breath sounds: Normal breath sounds. No  wheezing, rhonchi or rales.   Abdominal:      General: Abdomen is flat. Bowel sounds are normal.      Palpations: Abdomen is soft.      Tenderness: There is no abdominal tenderness. There is no right CVA tenderness, left CVA tenderness, guarding or rebound. Negative signs include Wiley's sign and McBurney's sign.   Musculoskeletal:         General: No swelling or deformity.      Cervical back: Full passive range of motion without pain.      Right lower leg: No edema.      Left lower leg: No edema.   Lymphadenopathy:      Cervical: No cervical adenopathy.   Skin:     Capillary Refill: Capillary refill takes less than 2 seconds.      Coloration: Skin is not jaundiced.      Findings: No rash.   Neurological:      General: No focal deficit present.      Mental Status: She is alert and oriented to person, place, and time. Mental status is at baseline.      Gait: Gait is intact.   Psychiatric:         Mood and Affect: Mood normal.         Behavior: Behavior is cooperative.         Labs Reviewed   CBC WITH AUTO DIFFERENTIAL - Abnormal       Result Value    WBC 10.4      nRBC 0.0      RBC 5.36 (*)     Hemoglobin 14.7      Hematocrit 44.7      MCV 83      MCH 27.4      MCHC 32.9      RDW 13.6      Platelets 228      Neutrophils % 72.6      Immature Granulocytes %, Automated 0.6      Lymphocytes % 19.3      Monocytes % 5.6      Eosinophils % 1.4      Basophils % 0.5      Neutrophils Absolute 7.55 (*)     Immature Granulocytes Absolute, Automated 0.06      Lymphocytes Absolute 2.01      Monocytes Absolute 0.58      Eosinophils Absolute 0.15      Basophils Absolute 0.05     BASIC METABOLIC PANEL - Abnormal    Glucose 102 (*)     Sodium 140      Potassium 4.1      Chloride 112 (*)     Bicarbonate 21      Anion Gap 11      Urea Nitrogen 25 (*)     Creatinine 0.68      eGFR >90      Calcium 9.6     URINALYSIS WITH REFLEX CULTURE AND MICROSCOPIC - Abnormal    Color, Urine Yellow      Appearance, Urine Turbid (*)     Specific  Gravity, Urine 1.032      pH, Urine 5.5      Protein, Urine 10 (TRACE)      Glucose, Urine Normal      Blood, Urine NEGATIVE      Ketones, Urine NEGATIVE      Bilirubin, Urine NEGATIVE      Urobilinogen, Urine Normal      Nitrite, Urine NEGATIVE      Leukocyte Esterase, Urine 500 Amanda/µL (*)    MICROSCOPIC ONLY, URINE - Abnormal    WBC, Urine >50 (*)     RBC, Urine 3-5      Squamous Epithelial Cells, Urine 10-25 (FEW)      Bacteria, Urine 1+ (*)     Mucus, Urine FEW      Hyaline Casts, Urine 3+ (*)    LACTATE - Normal    Lactate 0.9      Narrative:     Venipuncture immediately after or during the administration of Metamizole may lead to falsely low results. Testing should be performed immediately prior to Metamizole dosing.   URINE CULTURE   URINALYSIS WITH REFLEX CULTURE AND MICROSCOPIC    Narrative:     The following orders were created for panel order Urinalysis with Reflex Culture and Microscopic.  Procedure                               Abnormality         Status                     ---------                               -----------         ------                     Urinalysis with Reflex C...[[id number]]  Abnormal            Final result               Extra Urine Gray Tube[[id number]]                            In process                   Please view results for these tests on the individual orders.   EXTRA URINE GRAY TUBE       CT abdomen pelvis wo IV contrast   Final Result   1.  No acute abnormality is identified in the abdomen or pelvis. No   discrete radiopaque stones are present in the kidneys bilaterally.   There is no evidence of ureteral dilatation or ureterolithiasis,   although the distal most segment of the right ureter is incompletely   visualized due to beam hardening artifact from right hip replacement.   2. Scattered diverticula are present throughout the descending and   sigmoid colon without evidence of acute diverticulitis.   3. Small hiatal hernia.   4. 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             MACRO:   None        Signed by: Joe Dorsey 12/21/2024 7:17 PM   Dictation workstation:   [id number]               ED Course & MDM   Diagnoses as of 12/21/24 2034   Flank pain           Medical Decision Making  Patient was seen and evaluated by myself. Saline lock was established with labs drawn and results as above. Given Toradol with improvement in her pain. Blood counts, electrolytes, kidney function, and lactate were unremarkable. Urinalysis shows leukocytes with WBCs and 1+ bacteria, but likely contaminated. Without any urinary symptoms I will hold off on treatment. Urine was sent for C & S. Will treat if culture dictates need. CT scan of her abdomen and pelvis without contrast shows no acute abnormality is identified in the abdomen or pelvis; no discrete radiopaque stones are present in the kidneys bilaterally; there is no evidence of ureteral dilatation or ureterolithiasis, although the distal most segment of the right ureter is incompletely visualized due to beam hardening artifact from right hip replacement; scattered diverticula are present throughout the descending and sigmoid colon without evidence of acute diverticulitis; small hiatal hernia; changes of fibrous dysplasia in the right hemipelvis, similar to prior exams. Repeat abdominal evaluation reveals an abdomen soft, nondistended, and nontender to palpation. There was no rebound, rigidity, or guarding noted. There were no peritoneal signs noted. Continued to have multiple benign serial abdominal exams. At this time, we find no underlying evidence of acute pancreatitis, cholecystitis, cholangitis, diverticulitis, or appendicitis. Her symptoms may be muscular in nature. Given prescriptions for Mobic and Flexeril. No contraindications to NSAIDs are noted. Follow up with their doctor in 3 days. Return if worse in any way. Discharged in stable condition with computer  instructions.    Diagnostic Impression:     1. Acute right flank pain    2.              Your medication list        ASK your doctor about these medications        Instructions Last Dose Given Next Dose Due   acetaminophen 500 mg tablet  Commonly known as: Tylenol      Take 2 tablets (1,000 mg) by mouth every 6 hours if needed for mild pain (1 - 3).       albuterol 90 mcg/actuation inhaler      Inhale 1 puff every 4 hours if needed for wheezing or shortness of breath.       albuterol 2.5 mg /3 mL (0.083 %) nebulizer solution      Take 3 mL (2.5 mg) by nebulization 4 times a day as needed for wheezing or shortness of breath.       biotin 1 mg capsule           CALCIUM CITRATE ORAL           CINNAMON ORAL           coQ10 (ubiquinol) 100 mg capsule           cyanocobalamin 1,000 mcg tablet  Commonly known as: Vitamin B-12           FISH OIL ORAL           fluticasone propion-salmeteroL 500-50 mcg/dose diskus inhaler  Commonly known as: Wixela Inhub      Inhale 1 puff 2 times a day. Rinse mouth with water after use to reduce aftertaste and incidence of candidiasis. Do not swallow.       Gemtesa 75 mg tablet  Generic drug: vibegron           ibuprofen 200 mg tablet           MAGNESIUM ORAL           MILK THISTLE ORAL           Prolia 60 mg/mL syringe  Generic drug: denosumab           rosuvastatin 10 mg tablet  Commonly known as: Crestor      Take 1 tablet (10 mg) by mouth once daily.       sertraline 100 mg tablet  Commonly known as: Zoloft      Take 1.5 tablets (150 mg) by mouth once daily.       turmeric 400 mg capsule           VITAMIN B COMPLEX ORAL           VITAMIN C ORAL           VITAMIN D3-VITAMIN K2 ORAL           ZINC ORAL                      Procedure  Procedures     Johan Mackenzie, CLAY-CNP  12/21/24 2034

## 2024-12-22 LAB — HOLD SPECIMEN: NORMAL

## 2024-12-23 LAB — BACTERIA UR CULT: NORMAL

## 2025-01-06 ENCOUNTER — APPOINTMENT (OUTPATIENT)
Dept: PRIMARY CARE | Facility: CLINIC | Age: 75
End: 2025-01-06
Payer: MEDICARE

## 2025-02-20 ENCOUNTER — APPOINTMENT (OUTPATIENT)
Dept: PRIMARY CARE | Facility: CLINIC | Age: 75
End: 2025-02-20
Payer: MEDICARE

## 2025-04-01 LAB
ANION GAP SERPL CALCULATED.4IONS-SCNC: 10 MMOL/L (CALC) (ref 7–17)
BUN SERPL-MCNC: 19 MG/DL (ref 7–25)
BUN/CREAT SERPL: NORMAL (CALC) (ref 6–22)
CALCIUM SERPL-MCNC: 9.2 MG/DL (ref 8.6–10.4)
CHLORIDE SERPL-SCNC: 108 MMOL/L (ref 98–110)
CO2 SERPL-SCNC: 24 MMOL/L (ref 20–32)
CREAT SERPL-MCNC: 0.63 MG/DL (ref 0.6–1)
EGFRCR SERPLBLD CKD-EPI 2021: 94 ML/MIN/1.73M2
EST. AVERAGE GLUCOSE BLD GHB EST-MCNC: 117 MG/DL
EST. AVERAGE GLUCOSE BLD GHB EST-SCNC: 6.5 MMOL/L
GLUCOSE SERPL-MCNC: 90 MG/DL (ref 65–99)
HBA1C MFR BLD: 5.7 % OF TOTAL HGB
POTASSIUM SERPL-SCNC: 4.2 MMOL/L (ref 3.5–5.3)
SODIUM SERPL-SCNC: 142 MMOL/L (ref 135–146)

## 2025-04-04 ENCOUNTER — APPOINTMENT (OUTPATIENT)
Dept: PRIMARY CARE | Facility: CLINIC | Age: 75
End: 2025-04-04
Payer: MEDICARE

## 2025-04-04 VITALS
WEIGHT: 170 LBS | DIASTOLIC BLOOD PRESSURE: 79 MMHG | BODY MASS INDEX: 28.32 KG/M2 | OXYGEN SATURATION: 97 % | TEMPERATURE: 97.3 F | HEIGHT: 65 IN | RESPIRATION RATE: 14 BRPM | HEART RATE: 76 BPM | SYSTOLIC BLOOD PRESSURE: 145 MMHG

## 2025-04-04 DIAGNOSIS — R06.02 SHORTNESS OF BREATH: Chronic | ICD-10-CM

## 2025-04-04 DIAGNOSIS — Z13.0 SCREENING FOR DEFICIENCY ANEMIA: ICD-10-CM

## 2025-04-04 DIAGNOSIS — Z00.00 ENCOUNTER FOR MEDICARE ANNUAL WELLNESS EXAM: ICD-10-CM

## 2025-04-04 DIAGNOSIS — Z13.220 SCREENING FOR HYPERLIPIDEMIA: ICD-10-CM

## 2025-04-04 DIAGNOSIS — E11.9 TYPE 2 DIABETES MELLITUS WITHOUT COMPLICATION, WITHOUT LONG-TERM CURRENT USE OF INSULIN: ICD-10-CM

## 2025-04-04 DIAGNOSIS — Z13.89 SCREENING FOR NEPHROPATHY: ICD-10-CM

## 2025-04-04 DIAGNOSIS — Z00.00 ROUTINE GENERAL MEDICAL EXAMINATION AT HEALTH CARE FACILITY: ICD-10-CM

## 2025-04-04 DIAGNOSIS — M81.0 AGE-RELATED OSTEOPOROSIS WITHOUT CURRENT PATHOLOGICAL FRACTURE: ICD-10-CM

## 2025-04-04 DIAGNOSIS — Z13.29 SCREENING FOR THYROID DISORDER: ICD-10-CM

## 2025-04-04 DIAGNOSIS — F41.1 GENERALIZED ANXIETY DISORDER: ICD-10-CM

## 2025-04-04 DIAGNOSIS — Z12.31 BREAST CANCER SCREENING BY MAMMOGRAM: Primary | ICD-10-CM

## 2025-04-04 DIAGNOSIS — R73.03 PREDIABETES: ICD-10-CM

## 2025-04-04 DIAGNOSIS — J43.9 PULMONARY EMPHYSEMA, UNSPECIFIED EMPHYSEMA TYPE (MULTI): ICD-10-CM

## 2025-04-04 RX ORDER — ALBUTEROL SULFATE 90 UG/1
1 INHALANT RESPIRATORY (INHALATION) EVERY 4 HOURS PRN
Qty: 18 G | Refills: 11 | Status: SHIPPED | OUTPATIENT
Start: 2025-04-04 | End: 2025-05-04

## 2025-04-04 SDOH — ECONOMIC STABILITY: FOOD INSECURITY: WITHIN THE PAST 12 MONTHS, YOU WORRIED THAT YOUR FOOD WOULD RUN OUT BEFORE YOU GOT MONEY TO BUY MORE.: NEVER TRUE

## 2025-04-04 SDOH — ECONOMIC STABILITY: FOOD INSECURITY: WITHIN THE PAST 12 MONTHS, THE FOOD YOU BOUGHT JUST DIDN'T LAST AND YOU DIDN'T HAVE MONEY TO GET MORE.: NEVER TRUE

## 2025-04-04 ASSESSMENT — ANXIETY QUESTIONNAIRES
6. BECOMING EASILY ANNOYED OR IRRITABLE: NOT AT ALL
3. WORRYING TOO MUCH ABOUT DIFFERENT THINGS: NOT AT ALL
5. BEING SO RESTLESS THAT IT IS HARD TO SIT STILL: NOT AT ALL
1. FEELING NERVOUS, ANXIOUS, OR ON EDGE: NOT AT ALL
7. FEELING AFRAID AS IF SOMETHING AWFUL MIGHT HAPPEN: NOT AT ALL
4. TROUBLE RELAXING: NOT AT ALL
GAD7 TOTAL SCORE: 0
IF YOU CHECKED OFF ANY PROBLEMS ON THIS QUESTIONNAIRE, HOW DIFFICULT HAVE THESE PROBLEMS MADE IT FOR YOU TO DO YOUR WORK, TAKE CARE OF THINGS AT HOME, OR GET ALONG WITH OTHER PEOPLE: NOT DIFFICULT AT ALL
2. NOT BEING ABLE TO STOP OR CONTROL WORRYING: NOT AT ALL

## 2025-04-04 ASSESSMENT — LIFESTYLE VARIABLES
SKIP TO QUESTIONS 9-10: 1
HOW MANY STANDARD DRINKS CONTAINING ALCOHOL DO YOU HAVE ON A TYPICAL DAY: PATIENT DOES NOT DRINK
HOW OFTEN DO YOU HAVE A DRINK CONTAINING ALCOHOL: NEVER
AUDIT-C TOTAL SCORE: 0
HOW OFTEN DO YOU HAVE SIX OR MORE DRINKS ON ONE OCCASION: NEVER

## 2025-04-04 ASSESSMENT — ACTIVITIES OF DAILY LIVING (ADL)
DOING_HOUSEWORK: INDEPENDENT
DRESSING: INDEPENDENT
TAKING_MEDICATION: INDEPENDENT
GROCERY_SHOPPING: INDEPENDENT
MANAGING_FINANCES: INDEPENDENT
BATHING: INDEPENDENT

## 2025-04-04 ASSESSMENT — ENCOUNTER SYMPTOMS
OCCASIONAL FEELINGS OF UNSTEADINESS: 1
NEUROLOGICAL NEGATIVE: 1
SHORTNESS OF BREATH: 1
EYES NEGATIVE: 1
GASTROINTESTINAL NEGATIVE: 1
CARDIOVASCULAR NEGATIVE: 1
HEMATOLOGIC/LYMPHATIC NEGATIVE: 1
LOSS OF SENSATION IN FEET: 0
DEPRESSION: 0
RHINORRHEA: 1
COUGH: 1
ARTHRALGIAS: 1
PSYCHIATRIC NEGATIVE: 1
ENDOCRINE NEGATIVE: 1
CONSTITUTIONAL NEGATIVE: 1

## 2025-04-04 ASSESSMENT — PAIN SCALES - GENERAL: PAINLEVEL_OUTOF10: 0-NO PAIN

## 2025-04-04 ASSESSMENT — PATIENT HEALTH QUESTIONNAIRE - PHQ9
SUM OF ALL RESPONSES TO PHQ9 QUESTIONS 1 & 2: 0
1. LITTLE INTEREST OR PLEASURE IN DOING THINGS: NOT AT ALL
2. FEELING DOWN, DEPRESSED OR HOPELESS: NOT AT ALL

## 2025-04-04 NOTE — ASSESSMENT & PLAN NOTE
Flu vaccination: Previously given this season  PCV: Previously given  PPSV: Previously given  Shingrix vaccine: Previously given  RSV: Previously given  Colon cancer screening: Colonoscopy in 2024 with tubular adenoma; repeat study due in 2027  Mammogram:  Last in 6/2024, repeat study ordered  DEXA scan: Last in 7/2023, osteoporosis. On Prolia

## 2025-04-04 NOTE — ASSESSMENT & PLAN NOTE
Recommended to trial buspirone at last visit for breakthrough anxiety, not interested in adding any other medications  Reports her brother  suddenly a few weeks ago after hospitalization for a blood clot, states she found him dead in the elevator of his apartment building upon going to visit him.   Not interested in any medication changes at this time    Continue sertraline 150mg daily

## 2025-04-04 NOTE — PATIENT INSTRUCTIONS
Thank you for coming to see me today.  If you have any questions or concerns following our visit, please contact the office.  Phone: [phone]    Follow up with me in 6 months or sooner as needed    1)  Please schedule a mammogram to be done in June - please call [phone] or schedule at  on your way out today     2) Get fasting labwork a few days prior to next visit.  The lab is down the [place] from our office. You can schedule an appointment online by visiting appointment.[facility]     3) Let me know if you would like a stronger daily inhaler to help with breathing   denies smoking cigarettes, social alcohol use, denies illicit drug use

## 2025-04-04 NOTE — PROGRESS NOTES
"Subjective   Patient ID: Viki Kumar is a 75 y.o. female who presents for medicare wellness visit and 6 month follow up.    Admits to frequent falls (6+ this year). Having right shoulder pain and bilateral knee pain. States sometimes she takes aspirin but states \"my melatonin and gummy work better\". Does not know if pain is related to a previous fall or arthritis.     Had one previous prolia infusion but does not wish to finish series due to not liking the way it made her feel.     States \"I have good days, I have bad days\" regarding anxiety and depression. Never filled buspar due to not wanting to be on a lot of medications. Still takes sertraline. State she has suffered with this her whole life.     Has cough that started about 3 weeks ago when her  turned on fan. Is bringing up yellow phlegm. States she is not more SOB than normal but endorses dyspnea with exertion. Taking mucinex which helps; has allergy medication and flonase but not typically using regularly. Declining antibiotics at this time. Has been on Wixela inhaler and using regularly, but still having symptoms. Not interested in changing therapy today due to not liking change.  Reports she's out of breath walking to her neighbor's house, not wanting to start any inhalers at this point in time.     Diet: Eating a good amount of veggies, fruits and protein. Ice cream nightly, 1-2 big can soda per day Drinking half per sitting. Drinking 2 cups coffee per day  Exercise: No regular exercise due to back pain and balance issues  Weight: Stable  Water: Drinking 6-8 bottles per day   Sleep: Bad sleep, thinks she might be getting too much sleep. Napping during the day. Goes to bed around 9P, sometimes already in bed. Waking up around 8:30AM, waking up frequently through the night. Has to urinate overnight  Social: , son and her  live with her  Professional: Retired /    Review of Systems   Constitutional: " Negative.    HENT:  Positive for congestion and rhinorrhea.    Eyes: Negative.    Respiratory:  Positive for cough and shortness of breath.    Cardiovascular: Negative.    Gastrointestinal: Negative.    Endocrine: Negative.    Genitourinary: Negative.    Musculoskeletal:  Positive for arthralgias.   Skin: Negative.    Allergic/Immunologic: Positive for environmental allergies.   Neurological: Negative.    Hematological: Negative.    Psychiatric/Behavioral: Negative.          Current Outpatient Medications   Medication Sig Dispense Refill    acetaminophen (Tylenol) 500 mg tablet Take 2 tablets (1,000 mg) by mouth every 6 hours if needed for mild pain (1 - 3). 30 tablet 0    albuterol 2.5 mg /3 mL (0.083 %) nebulizer solution Take 3 mL (2.5 mg) by nebulization 4 times a day as needed for wheezing or shortness of breath. 100 mL 11    ascorbic acid (VITAMIN C ORAL) Take by mouth.      biotin 1 mg capsule Take by mouth.      cholecalciferol, vitD3,/vit K2 (VITAMIN D3-VITAMIN K2 ORAL) Take by mouth once daily. Unknown dose      cinnamon bark (CINNAMON ORAL) Take by mouth once daily. Unknown dose      coQ10, ubiquinol, 100 mg capsule Take 1 capsule (100 mg) by mouth once daily.      cyclobenzaprine (Flexeril) 10 mg tablet Take 1 tablet (10 mg) by mouth 3 times a day as needed for muscle spasms. 17 tablet 0    fluticasone propion-salmeteroL (Wixela Inhub) 500-50 mcg/dose diskus inhaler Inhale 1 puff 2 times a day. Rinse mouth with water after use to reduce aftertaste and incidence of candidiasis. Do not swallow. 60 each 11    ibuprofen 200 mg tablet Take 1 tablet (200 mg) by mouth every 2 hours if needed.      MAGNESIUM ORAL Take 200 mg by mouth.      meloxicam (Mobic) 15 mg tablet Take 1 tablet (15 mg) by mouth once daily. 10 tablet 0    MILK THISTLE ORAL Take 1,000 mg by mouth.      rosuvastatin (Crestor) 10 mg tablet Take 1 tablet (10 mg) by mouth once daily. 90 tablet 3    sertraline (Zoloft) 100 mg tablet Take 1.5  tablets (150 mg) by mouth once daily. 135 tablet 3    turmeric 400 mg capsule Take by mouth.      vibegron (Gemtesa) 75 mg tablet Take 1 tablet (75 mg) by mouth once daily.      VITAMIN B COMPLEX ORAL Take by mouth.      ZINC ORAL Take 100 mg by mouth.      albuterol 90 mcg/actuation inhaler Inhale 1 puff every 4 hours if needed for wheezing or shortness of breath. 18 g 11    CALCIUM CITRATE ORAL Take by mouth. (Patient not taking: Reported on 2025)      cyanocobalamin (Vitamin B-12) 1,000 mcg tablet Take 1 tablet (1,000 mcg) by mouth once daily.      docosahexaenoic acid/epa (FISH OIL ORAL) Take by mouth. (Patient not taking: Reported on 2025)       No current facility-administered medications for this visit.     Past Surgical History:   Procedure Laterality Date    HERNIA REPAIR      OTHER SURGICAL HISTORY  2018    Hernia repair    OTHER SURGICAL HISTORY  2018    Tonsillectomy    OTHER SURGICAL HISTORY  2018    Hip surgery    OTHER SURGICAL HISTORY  2018    Bone transplantation autograft    TONSILLECTOMY       Family History   Problem Relation Name Age of Onset    Other (SMALL CELL CARCINOMA) Mother      Emphysema Mother      Emphysema Father      Heart failure Father      Emphysema Brother Mayo     Lung cancer Brother Mayo     Coronary artery disease Brother Mayo     Other (cardiac bypass) Brother Mayo     Blood clot Brother Mayo 65         suddently follow hosp for blood clot    Other (shoulder replacement) Brother Timoteo     Gout Brother Timoteo     Memory loss Brother Manjit     Gout Brother Manjit     Accidental death Brother Amilcar 14        Accidentally shot himself in the head      Social History     Tobacco Use    Smoking status: Former     Current packs/day: 0.00     Average packs/day: 1 pack/day for 50.0 years (50.0 ttl pk-yrs)     Types: Cigarettes     Start date: 10/1/1972     Quit date: 10/1/2022     Years since quittin.5     Passive exposure: Past    Smokeless  "tobacco: Never   Vaping Use    Vaping status: Former   Substance Use Topics    Alcohol use: Yes     Comment: occassionally    Drug use: Yes     Comment: CBD gummies  Tori tried them        Objective     Visit Vitals  /79 (BP Location: Left arm, Patient Position: Sitting, BP Cuff Size: Adult)   Pulse 76   Temp 36.3 °C (97.3 °F) (Temporal)   Resp 14   Ht 1.651 m (5' 5\")   Wt 77.1 kg (170 lb)   SpO2 97%   BMI 28.29 kg/m²   OB Status Postmenopausal   Smoking Status Former   BSA 1.88 m²        Physical Exam  Constitutional:       Appearance: Normal appearance.   HENT:      Head: Normocephalic and atraumatic.      Nose: Nose normal.   Eyes:      Extraocular Movements: Extraocular movements intact.   Cardiovascular:      Heart sounds: Normal heart sounds.   Pulmonary:      Effort: Pulmonary effort is normal.      Breath sounds: Normal breath sounds.   Abdominal:      General: Abdomen is flat.      Palpations: Abdomen is soft.   Musculoskeletal:         General: Normal range of motion.      Cervical back: Normal range of motion.      Right lower leg: Edema present.      Left lower leg: Edema present.   Skin:     General: Skin is warm and dry.   Neurological:      Mental Status: She is alert and oriented to person, place, and time.   Psychiatric:         Mood and Affect: Mood normal.         Behavior: Behavior normal.           Assessment/Plan   Problem List Items Addressed This Visit       Chronic obstructive pulmonary disease (Multi)     Reports adherence with Wixela inhaler  Still having shortness of breath walking to her neighbors house  Discussed switch to Trelegy inhaler which would be daily medication however patient not interested at this time    Continue Wixela inhaler  Refill albuterol inhaler as she states her current inhaler is likely          Relevant Medications    albuterol 90 mcg/actuation inhaler    Generalized anxiety disorder     Recommended to trial buspirone at last visit for breakthrough " anxiety, not interested in adding any other medications  Reports her brother  suddenly a few weeks ago after hospitalization for a blood clot, states she found him dead in the elevator of his apartment building upon going to visit him.   Not interested in any medication changes at this time    Continue sertraline 150mg daily         Encounter for Medicare annual wellness exam     Flu vaccination: Previously given this season  PCV: Previously given  PPSV: Previously given  Shingrix vaccine: Previously given  RSV: Previously given  Colon cancer screening: Colonoscopy in  with tubular adenoma; repeat study due in   Mammogram:  Last in 2024, repeat study ordered  DEXA scan: Last in 2023, osteoporosis. On Prolia         Age-related osteoporosis without current pathological fracture     Trialed on prolia injection, states she didn't like how it made her feel  Not agreeable to trialing additional medication at this time    Consider checking bone density if patient agreeable to therapy in the future         Relevant Orders    Vitamin D 25-Hydroxy,Total (for eval of Vitamin D levels)     Other Visit Diagnoses       Breast cancer screening by mammogram    -  Primary    Relevant Orders    BI mammo bilateral screening tomosynthesis    Routine general medical examination at health care facility        Relevant Orders    1 Year Follow Up In Primary Care - Wellness Exam    1 Year Follow Up In Primary Care - Wellness Exam    Shortness of breath  (Chronic)       Relevant Medications    albuterol 90 mcg/actuation inhaler    Screening for deficiency anemia        Relevant Orders    CBC    Screening for nephropathy        Relevant Orders    Comprehensive Metabolic Panel    Screening for hyperlipidemia        Relevant Orders    Lipid Panel    Screening for thyroid disorder        Relevant Orders    TSH with reflex to Free T4 if abnormal    Prediabetes        Relevant Orders    Hemoglobin A1C    Type 2 diabetes mellitus  without complication, without long-term current use of insulin        Relevant Orders    CBC              All pertinent lab work and results were reviewed with patient.     Follow up with me in 1 year    Jeanna French, CLAY-CNS

## 2025-04-04 NOTE — ASSESSMENT & PLAN NOTE
Trialed on prolia injection, states she didn't like how it made her feel  Not agreeable to trialing additional medication at this time    Consider checking bone density if patient agreeable to therapy in the future

## 2025-04-04 NOTE — ASSESSMENT & PLAN NOTE
Reports adherence with Wixela inhaler  Still having shortness of breath walking to her neighbors house  Discussed switch to Trelegy inhaler which would be daily medication however patient not interested at this time    Continue Wixela inhaler  Refill albuterol inhaler as she states her current inhaler is likely

## 2025-04-21 ENCOUNTER — APPOINTMENT (OUTPATIENT)
Dept: INFUSION THERAPY | Facility: HOSPITAL | Age: 75
End: 2025-04-21
Payer: MEDICARE

## 2025-06-19 DIAGNOSIS — J43.9 PULMONARY EMPHYSEMA, UNSPECIFIED EMPHYSEMA TYPE (MULTI): ICD-10-CM

## 2025-06-19 RX ORDER — FLUTICASONE PROPIONATE AND SALMETEROL 500; 50 UG/1; UG/1
POWDER RESPIRATORY (INHALATION)
Qty: 60 EACH | Refills: 9 | Status: SHIPPED | OUTPATIENT
Start: 2025-06-19

## 2025-06-25 ENCOUNTER — APPOINTMENT (OUTPATIENT)
Dept: RADIOLOGY | Facility: HOSPITAL | Age: 75
End: 2025-06-25
Payer: MEDICARE

## 2025-07-07 ENCOUNTER — APPOINTMENT (OUTPATIENT)
Dept: RADIOLOGY | Facility: HOSPITAL | Age: 75
End: 2025-07-07
Payer: MEDICARE

## 2025-07-07 VITALS — HEIGHT: 64 IN | BODY MASS INDEX: 30.73 KG/M2 | WEIGHT: 180 LBS

## 2025-07-07 DIAGNOSIS — Z12.31 BREAST CANCER SCREENING BY MAMMOGRAM: ICD-10-CM

## 2025-07-07 PROCEDURE — 77067 SCR MAMMO BI INCL CAD: CPT | Performed by: RADIOLOGY

## 2025-07-07 PROCEDURE — 77067 SCR MAMMO BI INCL CAD: CPT

## 2025-07-07 PROCEDURE — 77063 BREAST TOMOSYNTHESIS BI: CPT | Performed by: RADIOLOGY

## 2025-07-08 ENCOUNTER — TELEPHONE (OUTPATIENT)
Dept: PRIMARY CARE | Facility: CLINIC | Age: 75
End: 2025-07-08
Payer: MEDICARE

## 2025-07-08 DIAGNOSIS — R92.8 ABNORMALITY OF RIGHT BREAST ON SCREENING MAMMOGRAPHY: Primary | ICD-10-CM

## 2025-07-08 NOTE — TELEPHONE ENCOUNTER
Patient called in reference to her Bi mammo bilateral results.  She stated they came back abnormal and is very concerned.  She would like you to review them and advise her asap of her next step.

## 2025-07-09 NOTE — TELEPHONE ENCOUNTER
Abnormal right mammogram  Diagnostic mammogram and US ordered to investigate further. Can you call her and help her get this scheduled?

## 2025-07-15 ENCOUNTER — HOSPITAL ENCOUNTER (OUTPATIENT)
Dept: RADIOLOGY | Facility: HOSPITAL | Age: 75
Discharge: HOME | End: 2025-07-15
Payer: MEDICARE

## 2025-07-15 DIAGNOSIS — R92.8 ABNORMALITY OF RIGHT BREAST ON SCREENING MAMMOGRAPHY: ICD-10-CM

## 2025-07-15 PROCEDURE — 77065 DX MAMMO INCL CAD UNI: CPT | Mod: RIGHT SIDE

## 2025-07-15 PROCEDURE — 77061 BREAST TOMOSYNTHESIS UNI: CPT | Mod: RT

## 2025-07-15 PROCEDURE — 77061 BREAST TOMOSYNTHESIS UNI: CPT | Mod: RIGHT SIDE

## 2025-07-25 ENCOUNTER — TELEPHONE (OUTPATIENT)
Dept: RADIOLOGY | Facility: HOSPITAL | Age: 75
End: 2025-07-25
Payer: MEDICARE

## 2025-07-25 NOTE — TELEPHONE ENCOUNTER
VM message left requesting patient to call and schedule the follow up Lung Cancer Screening exam. Requested they schedule with radiology @ [phone].Encouraged  to return my call @  [phone] for any additional assistance. [facility] My Chart message was left for patient with scheduling instructions.

## 2025-07-31 ENCOUNTER — TELEPHONE (OUTPATIENT)
Dept: PRIMARY CARE | Facility: CLINIC | Age: 75
End: 2025-07-31
Payer: MEDICARE

## 2025-07-31 NOTE — TELEPHONE ENCOUNTER
Patient got a message to schedule a lung cancer screening.  She needs to know if an order needs to be put in for that so it is covered by her insurance.  She would like a call back

## 2025-10-09 ENCOUNTER — APPOINTMENT (OUTPATIENT)
Dept: PRIMARY CARE | Facility: CLINIC | Age: 75
End: 2025-10-09
Payer: MEDICARE

## 2025-10-30 ENCOUNTER — APPOINTMENT (OUTPATIENT)
Dept: UROLOGY | Facility: CLINIC | Age: 75
End: 2025-10-30
Payer: MEDICARE

## 2026-04-09 ENCOUNTER — APPOINTMENT (OUTPATIENT)
Dept: PRIMARY CARE | Facility: CLINIC | Age: 76
End: 2026-04-09
Payer: MEDICARE

## (undated) DEVICE — Device

## (undated) DEVICE — DRAPE PACK, LAVH, W/ATTACHED LEGGINGS, W/POUCH, 100 X 114 IN, LF, STERILE

## (undated) DEVICE — SUTURE, VICRYL, 0, 27 IN, CT-2, UNDYED

## (undated) DEVICE — NEEDLE, SPINAL, 20 G X 3.5 IN, YELLOW HUB

## (undated) DEVICE — SOLUTION, IRRIGATION, SODIUM CHLORIDE 0.9%, 1000 ML, POUR BOTTLE

## (undated) DEVICE — LUBRICANT, WATER SOLUBLE, BACTERIOSTATIC, 2 OZ, STERILE

## (undated) DEVICE — TIP, SUCTION, YANKAUER, BULB, OPEN TIP, W/O CONTROL VENT, LF, CLEAR

## (undated) DEVICE — GLOVE, PROTEXIS PI CLASSIC, SZ-7.5, PF, LF

## (undated) DEVICE — GOWN, ASTOUND, L

## (undated) DEVICE — RETRACTOR, SURGICAL, RING, PLASTIC, DISPOSABLE

## (undated) DEVICE — IRRIGATION SET, CYSTOSCOPY, REGULATING CLAMP, STRAIGHT, 81 IN

## (undated) DEVICE — ADHESIVE, SKIN, LIQUIBAND EXCEED

## (undated) DEVICE — POSITIONING KIT, PINK PAD XL, ADVANCED TRENDELENBURG

## (undated) DEVICE — PAD, GROUNDING, ELECTROSURGICAL, W/9 FT CABLE, POLYHESIVE II, ADULT, LF

## (undated) DEVICE — PREP, SCRUB, SKIN, FOAM, HIBICLENS, 4 OZ

## (undated) DEVICE — TRAY, SURESTEP, URINE METER, 16FR, COMPLETE, W/STATLOCK

## (undated) DEVICE — SPONGE, GAUZE, XRAY DECT, 16 PLY, 4 X 4, W/MASTER DMT,STERILE

## (undated) DEVICE — PREP TRAY, SKIN, DRY, W/GLOVES

## (undated) DEVICE — COVER HANDLE LIGHT, STERIS, BLUE, STERILE

## (undated) DEVICE — SUTURE, MONOCRYL, 4-0, 18 IN, PS2, UNDYED

## (undated) DEVICE — SLING, DESARA, BLUE TV, WITH 2.7 INTRODUCER